# Patient Record
Sex: MALE | Race: WHITE | NOT HISPANIC OR LATINO | Employment: FULL TIME | URBAN - METROPOLITAN AREA
[De-identification: names, ages, dates, MRNs, and addresses within clinical notes are randomized per-mention and may not be internally consistent; named-entity substitution may affect disease eponyms.]

---

## 2017-12-12 ENCOUNTER — HISTORICAL (OUTPATIENT)
Dept: ADMINISTRATIVE | Facility: HOSPITAL | Age: 52
End: 2017-12-12

## 2017-12-12 LAB
ALBUMIN SERPL-MCNC: 3.9 GM/DL (ref 3.4–5)
ALBUMIN/GLOB SERPL: 1.1 {RATIO}
ALP SERPL-CCNC: 118 UNIT/L (ref 50–136)
ALT SERPL-CCNC: 13 UNIT/L (ref 12–78)
AST SERPL-CCNC: 16 UNIT/L (ref 15–37)
BILIRUB SERPL-MCNC: 0.4 MG/DL (ref 0.2–1)
BILIRUBIN DIRECT+TOT PNL SERPL-MCNC: 0.1 MG/DL (ref 0–0.2)
BILIRUBIN DIRECT+TOT PNL SERPL-MCNC: 0.3 MG/DL (ref 0–0.8)
BUN SERPL-MCNC: 8 MG/DL (ref 7–18)
CALCIUM SERPL-MCNC: 9.1 MG/DL (ref 8.5–10.1)
CHLORIDE SERPL-SCNC: 104 MMOL/L (ref 98–107)
CHOLEST SERPL-MCNC: 169 MG/DL (ref 0–200)
CHOLEST/HDLC SERPL: 3.8 {RATIO} (ref 0–5)
CO2 SERPL-SCNC: 28 MMOL/L (ref 21–32)
CREAT SERPL-MCNC: 0.91 MG/DL (ref 0.7–1.3)
EST. AVERAGE GLUCOSE BLD GHB EST-MCNC: 108 MG/DL
GLOBULIN SER-MCNC: 3.5 GM/DL (ref 2.4–3.5)
GLUCOSE SERPL-MCNC: 94 MG/DL (ref 74–106)
HBA1C MFR BLD: 5.4 % (ref 4.2–6.3)
HDLC SERPL-MCNC: 44 MG/DL (ref 35–60)
LDLC SERPL CALC-MCNC: 104 MG/DL (ref 0–129)
POTASSIUM SERPL-SCNC: 4.8 MMOL/L (ref 3.5–5.1)
PROT SERPL-MCNC: 7.4 GM/DL (ref 6.4–8.2)
SODIUM SERPL-SCNC: 139 MMOL/L (ref 136–145)
TRIGL SERPL-MCNC: 106 MG/DL (ref 30–150)
VLDLC SERPL CALC-MCNC: 21 MG/DL

## 2019-04-22 ENCOUNTER — HISTORICAL (OUTPATIENT)
Dept: ADMINISTRATIVE | Facility: HOSPITAL | Age: 54
End: 2019-04-22

## 2019-04-22 LAB
APPEARANCE, UA: CLEAR
BACTERIA SPEC CULT: NORMAL /HPF
BILIRUB UR QL STRIP: NEGATIVE
BUN SERPL-MCNC: 14 MG/DL (ref 7–18)
CALCIUM SERPL-MCNC: 9.2 MG/DL (ref 8.5–10.1)
CHLORIDE SERPL-SCNC: 107 MMOL/L (ref 98–107)
CHOLEST SERPL-MCNC: 180 MG/DL (ref 0–200)
CHOLEST/HDLC SERPL: 3.4 {RATIO} (ref 0–5)
CO2 SERPL-SCNC: 27 MMOL/L (ref 21–32)
COLOR UR: YELLOW
CREAT SERPL-MCNC: 0.85 MG/DL (ref 0.7–1.3)
CREAT/UREA NIT SERPL: 16.5
GLUCOSE (UA): NEGATIVE
GLUCOSE SERPL-MCNC: 102 MG/DL (ref 74–106)
HDLC SERPL-MCNC: 53 MG/DL (ref 35–60)
HGB UR QL STRIP: NEGATIVE
KETONES UR QL STRIP: NEGATIVE
LDLC SERPL CALC-MCNC: 111 MG/DL (ref 0–129)
LEUKOCYTE ESTERASE UR QL STRIP: NEGATIVE
NITRITE UR QL STRIP: NEGATIVE
PH UR STRIP: 6 [PH] (ref 5–9)
POTASSIUM SERPL-SCNC: 5.1 MMOL/L (ref 3.5–5.1)
PROT UR QL STRIP: NEGATIVE
RBC #/AREA URNS HPF: NORMAL /[HPF]
SODIUM SERPL-SCNC: 138 MMOL/L (ref 136–145)
SP GR UR STRIP: 1.01 (ref 1–1.03)
SQUAMOUS EPITHELIAL, UA: NORMAL
TRIGL SERPL-MCNC: 79 MG/DL (ref 30–150)
UROBILINOGEN UR STRIP-ACNC: 0.2
VLDLC SERPL CALC-MCNC: 16 MG/DL
WBC #/AREA URNS HPF: NORMAL /HPF

## 2019-06-10 ENCOUNTER — HISTORICAL (OUTPATIENT)
Dept: RADIOLOGY | Facility: HOSPITAL | Age: 54
End: 2019-06-10

## 2019-10-23 ENCOUNTER — HISTORICAL (OUTPATIENT)
Dept: ADMINISTRATIVE | Facility: HOSPITAL | Age: 54
End: 2019-10-23

## 2019-10-23 LAB
ALBUMIN SERPL-MCNC: 3.7 GM/DL (ref 3.4–5)
ALBUMIN/GLOB SERPL: 1.2 {RATIO}
ALP SERPL-CCNC: 137 UNIT/L (ref 50–136)
ALT SERPL-CCNC: 10 UNIT/L (ref 12–78)
AST SERPL-CCNC: 14 UNIT/L (ref 15–37)
BILIRUB SERPL-MCNC: 0.4 MG/DL (ref 0.2–1)
BILIRUBIN DIRECT+TOT PNL SERPL-MCNC: 0.1 MG/DL (ref 0–0.2)
BILIRUBIN DIRECT+TOT PNL SERPL-MCNC: 0.3 MG/DL (ref 0–0.8)
BUN SERPL-MCNC: 8 MG/DL (ref 7–18)
CALCIUM SERPL-MCNC: 8.6 MG/DL (ref 8.5–10.1)
CHLORIDE SERPL-SCNC: 106 MMOL/L (ref 98–107)
CHOLEST SERPL-MCNC: 157 MG/DL (ref 0–200)
CHOLEST/HDLC SERPL: 4 {RATIO} (ref 0–5)
CO2 SERPL-SCNC: 28 MMOL/L (ref 21–32)
CREAT SERPL-MCNC: 0.93 MG/DL (ref 0.7–1.3)
GLOBULIN SER-MCNC: 3.1 GM/DL (ref 2.4–3.5)
GLUCOSE SERPL-MCNC: 89 MG/DL (ref 74–106)
HDLC SERPL-MCNC: 39 MG/DL (ref 35–60)
LDLC SERPL CALC-MCNC: 98 MG/DL (ref 0–129)
POTASSIUM SERPL-SCNC: 4.2 MMOL/L (ref 3.5–5.1)
PROT SERPL-MCNC: 6.8 GM/DL (ref 6.4–8.2)
SODIUM SERPL-SCNC: 139 MMOL/L (ref 136–145)
TRIGL SERPL-MCNC: 99 MG/DL (ref 30–150)
VLDLC SERPL CALC-MCNC: 20 MG/DL

## 2020-01-08 ENCOUNTER — HISTORICAL (OUTPATIENT)
Dept: ADMINISTRATIVE | Facility: HOSPITAL | Age: 55
End: 2020-01-08

## 2020-01-08 LAB
CHOLEST SERPL-MCNC: 228 MG/DL (ref 0–200)
CHOLEST/HDLC SERPL: 8.8 {RATIO} (ref 0–5)
HDLC SERPL-MCNC: 26 MG/DL (ref 35–60)
LDLC SERPL CALC-MCNC: 92 MG/DL (ref 0–129)
TRIGL SERPL-MCNC: 551 MG/DL (ref 30–150)
VLDLC SERPL CALC-MCNC: 110 MG/DL

## 2020-10-02 ENCOUNTER — HISTORICAL (OUTPATIENT)
Dept: ADMINISTRATIVE | Facility: HOSPITAL | Age: 55
End: 2020-10-02

## 2020-10-02 LAB
ABS NEUT (OLG): 2.85 X10(3)/MCL (ref 2.1–9.2)
ALBUMIN SERPL-MCNC: 4.1 GM/DL (ref 3.5–5)
ALBUMIN/GLOB SERPL: 1.4 RATIO (ref 1.1–2)
ALP SERPL-CCNC: 116 UNIT/L (ref 40–150)
ALT SERPL-CCNC: 8 UNIT/L (ref 0–55)
AST SERPL-CCNC: 16 UNIT/L (ref 5–34)
BASOPHILS # BLD AUTO: 0 X10(3)/MCL (ref 0–0.2)
BASOPHILS NFR BLD AUTO: 0 %
BILIRUB SERPL-MCNC: 0.6 MG/DL
BILIRUBIN DIRECT+TOT PNL SERPL-MCNC: 0.2 MG/DL (ref 0–0.5)
BILIRUBIN DIRECT+TOT PNL SERPL-MCNC: 0.4 MG/DL (ref 0–0.8)
BUN SERPL-MCNC: 7.6 MG/DL (ref 8.4–25.7)
CALCIUM SERPL-MCNC: 9 MG/DL (ref 8.4–10.2)
CHLORIDE SERPL-SCNC: 104 MMOL/L (ref 98–107)
CHOLEST SERPL-MCNC: 172 MG/DL
CHOLEST/HDLC SERPL: 5 {RATIO} (ref 0–5)
CO2 SERPL-SCNC: 24 MMOL/L (ref 22–29)
CREAT SERPL-MCNC: 0.77 MG/DL (ref 0.73–1.18)
EOSINOPHIL # BLD AUTO: 0.2 X10(3)/MCL (ref 0–0.9)
EOSINOPHIL NFR BLD AUTO: 3 %
ERYTHROCYTE [DISTWIDTH] IN BLOOD BY AUTOMATED COUNT: 12.9 % (ref 11.5–17)
GLOBULIN SER-MCNC: 3 GM/DL (ref 2.4–3.5)
GLUCOSE SERPL-MCNC: 93 MG/DL (ref 74–100)
HCT VFR BLD AUTO: 44.4 % (ref 42–52)
HDLC SERPL-MCNC: 36 MG/DL (ref 35–60)
HGB BLD-MCNC: 14.8 GM/DL (ref 14–18)
LDLC SERPL CALC-MCNC: 96 MG/DL (ref 50–140)
LYMPHOCYTES # BLD AUTO: 2.3 X10(3)/MCL (ref 0.6–4.6)
LYMPHOCYTES NFR BLD AUTO: 40 %
MCH RBC QN AUTO: 33 PG (ref 27–31)
MCHC RBC AUTO-ENTMCNC: 33.3 GM/DL (ref 33–36)
MCV RBC AUTO: 98.9 FL (ref 80–94)
MONOCYTES # BLD AUTO: 0.5 X10(3)/MCL (ref 0.1–1.3)
MONOCYTES NFR BLD AUTO: 8 %
NEUTROPHILS # BLD AUTO: 2.85 X10(3)/MCL (ref 2.1–9.2)
NEUTROPHILS NFR BLD AUTO: 49 %
PLATELET # BLD AUTO: 275 X10(3)/MCL (ref 130–400)
PMV BLD AUTO: 10.4 FL (ref 9.4–12.4)
POTASSIUM SERPL-SCNC: 4.9 MMOL/L (ref 3.5–5.1)
PROT SERPL-MCNC: 7.1 GM/DL (ref 6.4–8.3)
PSA SERPL-MCNC: 0.45 NG/ML
RBC # BLD AUTO: 4.49 X10(6)/MCL (ref 4.7–6.1)
SODIUM SERPL-SCNC: 140 MMOL/L (ref 136–145)
TRIGL SERPL-MCNC: 201 MG/DL (ref 34–140)
TSH SERPL-ACNC: 1.1 UIU/ML (ref 0.35–4.94)
VLDLC SERPL CALC-MCNC: 40 MG/DL
WBC # SPEC AUTO: 5.8 X10(3)/MCL (ref 4.5–11.5)

## 2021-01-11 ENCOUNTER — HISTORICAL (OUTPATIENT)
Dept: RADIOLOGY | Facility: HOSPITAL | Age: 56
End: 2021-01-11

## 2021-05-17 LAB — CRC RECOMMENDATION EXT: NORMAL

## 2021-10-15 ENCOUNTER — HISTORICAL (OUTPATIENT)
Dept: ADMINISTRATIVE | Facility: HOSPITAL | Age: 56
End: 2021-10-15

## 2021-10-15 LAB
ABS NEUT (OLG): 3.32 X10(3)/MCL (ref 2.1–9.2)
ALBUMIN SERPL-MCNC: 3.8 GM/DL (ref 3.5–5)
ALBUMIN/GLOB SERPL: 1.3 RATIO (ref 1.1–2)
ALP SERPL-CCNC: 131 UNIT/L (ref 40–150)
ALT SERPL-CCNC: 7 UNIT/L (ref 0–55)
APPEARANCE, UA: CLEAR
AST SERPL-CCNC: 21 UNIT/L (ref 5–34)
BACTERIA SPEC CULT: NORMAL /HPF
BASOPHILS # BLD AUTO: 0 X10(3)/MCL (ref 0–0.2)
BASOPHILS NFR BLD AUTO: 0 %
BILIRUB SERPL-MCNC: 0.5 MG/DL
BILIRUB UR QL STRIP: NEGATIVE
BILIRUBIN DIRECT+TOT PNL SERPL-MCNC: 0.2 MG/DL (ref 0–0.5)
BILIRUBIN DIRECT+TOT PNL SERPL-MCNC: 0.3 MG/DL (ref 0–0.8)
BUN SERPL-MCNC: 15 MG/DL (ref 8.4–25.7)
CALCIUM SERPL-MCNC: 9.2 MG/DL (ref 8.4–10.2)
CHLORIDE SERPL-SCNC: 105 MMOL/L (ref 98–107)
CHOLEST SERPL-MCNC: 162 MG/DL
CHOLEST/HDLC SERPL: 5 {RATIO} (ref 0–5)
CO2 SERPL-SCNC: 26 MMOL/L (ref 22–29)
COLOR UR: YELLOW
CREAT SERPL-MCNC: 0.87 MG/DL (ref 0.73–1.18)
EOSINOPHIL # BLD AUTO: 0.2 X10(3)/MCL (ref 0–0.9)
EOSINOPHIL NFR BLD AUTO: 4 %
ERYTHROCYTE [DISTWIDTH] IN BLOOD BY AUTOMATED COUNT: 13.2 % (ref 11.5–17)
EST. AVERAGE GLUCOSE BLD GHB EST-MCNC: 99.7 MG/DL
GLOBULIN SER-MCNC: 3 GM/DL (ref 2.4–3.5)
GLUCOSE (UA): NEGATIVE
GLUCOSE SERPL-MCNC: 89 MG/DL (ref 74–100)
HBA1C MFR BLD: 5.1 %
HCT VFR BLD AUTO: 43 % (ref 42–52)
HDLC SERPL-MCNC: 31 MG/DL (ref 35–60)
HGB BLD-MCNC: 14.3 GM/DL (ref 14–18)
HGB UR QL STRIP: NEGATIVE
KETONES UR QL STRIP: NEGATIVE
LDLC SERPL CALC-MCNC: ABNORMAL MG/DL (ref 50–140)
LEUKOCYTE ESTERASE UR QL STRIP: NEGATIVE
LYMPHOCYTES # BLD AUTO: 2.2 X10(3)/MCL (ref 0.6–4.6)
LYMPHOCYTES NFR BLD AUTO: 34 %
MCH RBC QN AUTO: 32.4 PG (ref 27–31)
MCHC RBC AUTO-ENTMCNC: 33.3 GM/DL (ref 33–36)
MCV RBC AUTO: 97.3 FL (ref 80–94)
MONOCYTES # BLD AUTO: 0.6 X10(3)/MCL (ref 0.1–1.3)
MONOCYTES NFR BLD AUTO: 9 %
NEUTROPHILS # BLD AUTO: 3.32 X10(3)/MCL (ref 2.1–9.2)
NEUTROPHILS NFR BLD AUTO: 52 %
NITRITE UR QL STRIP: NEGATIVE
PH UR STRIP: 6 [PH] (ref 5–9)
PLATELET # BLD AUTO: 260 X10(3)/MCL (ref 130–400)
PMV BLD AUTO: 11.6 FL (ref 9.4–12.4)
POTASSIUM SERPL-SCNC: 4.5 MMOL/L (ref 3.5–5.1)
PROT SERPL-MCNC: 6.8 GM/DL (ref 6.4–8.3)
PROT UR QL STRIP: NEGATIVE
PSA SERPL-MCNC: 0.46 NG/ML
RBC # BLD AUTO: 4.42 X10(6)/MCL (ref 4.7–6.1)
RBC #/AREA URNS HPF: NORMAL /[HPF]
SODIUM SERPL-SCNC: 142 MMOL/L (ref 136–145)
SP GR UR STRIP: 1.01 (ref 1–1.03)
SQUAMOUS EPITHELIAL, UA: NORMAL /HPF (ref 0–4)
TESTOST SERPL-MCNC: 363.38 NG/DL (ref 220.91–715.81)
TRIGL SERPL-MCNC: 405 MG/DL (ref 34–140)
TSH SERPL-ACNC: 2.35 UIU/ML (ref 0.35–4.94)
UROBILINOGEN UR STRIP-ACNC: 0.2
VLDLC SERPL CALC-MCNC: ABNORMAL MG/DL
WBC # SPEC AUTO: 6.3 X10(3)/MCL (ref 4.5–11.5)
WBC #/AREA URNS HPF: NORMAL /HPF

## 2021-12-01 ENCOUNTER — HISTORICAL (OUTPATIENT)
Dept: ADMINISTRATIVE | Facility: HOSPITAL | Age: 56
End: 2021-12-01

## 2021-12-01 LAB
CHOLEST SERPL-MCNC: 193 MG/DL
CHOLEST/HDLC SERPL: 6 {RATIO} (ref 0–5)
HDLC SERPL-MCNC: 33 MG/DL (ref 35–60)
LDLC SERPL CALC-MCNC: 131 MG/DL (ref 50–140)
TRIGL SERPL-MCNC: 146 MG/DL (ref 34–140)
VLDLC SERPL CALC-MCNC: 29 MG/DL

## 2022-02-07 ENCOUNTER — HISTORICAL (OUTPATIENT)
Dept: RADIOLOGY | Facility: HOSPITAL | Age: 57
End: 2022-02-07

## 2022-02-07 ENCOUNTER — HISTORICAL (OUTPATIENT)
Dept: ADMINISTRATIVE | Facility: HOSPITAL | Age: 57
End: 2022-02-07

## 2022-02-10 ENCOUNTER — HISTORICAL (OUTPATIENT)
Dept: ADMINISTRATIVE | Facility: HOSPITAL | Age: 57
End: 2022-02-10

## 2022-02-10 LAB
CHOLEST SERPL-MCNC: 133 MG/DL
CHOLEST/HDLC SERPL: 4 {RATIO} (ref 0–5)
HDLC SERPL-MCNC: 30 MG/DL (ref 35–60)
LDLC SERPL CALC-MCNC: 88 MG/DL (ref 50–140)
TRIGL SERPL-MCNC: 76 MG/DL (ref 34–140)
VLDLC SERPL CALC-MCNC: 15 MG/DL

## 2022-04-10 ENCOUNTER — HISTORICAL (OUTPATIENT)
Dept: ADMINISTRATIVE | Facility: HOSPITAL | Age: 57
End: 2022-04-10
Payer: COMMERCIAL

## 2022-04-26 VITALS
HEIGHT: 65 IN | BODY MASS INDEX: 28.6 KG/M2 | OXYGEN SATURATION: 100 % | WEIGHT: 171.63 LBS | DIASTOLIC BLOOD PRESSURE: 74 MMHG | SYSTOLIC BLOOD PRESSURE: 116 MMHG

## 2022-08-15 DIAGNOSIS — I10 HYPERTENSION, UNSPECIFIED TYPE: Primary | ICD-10-CM

## 2022-08-15 DIAGNOSIS — E78.1 HYPERTRIGLYCERIDEMIA: ICD-10-CM

## 2022-08-19 ENCOUNTER — LAB VISIT (OUTPATIENT)
Dept: LAB | Facility: HOSPITAL | Age: 57
End: 2022-08-19
Attending: INTERNAL MEDICINE
Payer: COMMERCIAL

## 2022-08-19 DIAGNOSIS — I10 HYPERTENSION, UNSPECIFIED TYPE: ICD-10-CM

## 2022-08-19 DIAGNOSIS — E78.1 HYPERTRIGLYCERIDEMIA: ICD-10-CM

## 2022-08-19 LAB
ALBUMIN SERPL-MCNC: 3.7 GM/DL (ref 3.5–5)
ALP SERPL-CCNC: 128 UNIT/L (ref 40–150)
ALT SERPL-CCNC: 13 UNIT/L (ref 0–55)
ANION GAP SERPL CALC-SCNC: 9 MEQ/L
AST SERPL-CCNC: 20 UNIT/L (ref 5–34)
BILIRUBIN DIRECT+TOT PNL SERPL-MCNC: 0.1 MG/DL (ref 0–0.5)
BILIRUBIN DIRECT+TOT PNL SERPL-MCNC: 0.1 MG/DL (ref 0–0.8)
BILIRUBIN DIRECT+TOT PNL SERPL-MCNC: 0.2 MG/DL
BUN SERPL-MCNC: 18.9 MG/DL (ref 8.4–25.7)
CALCIUM SERPL-MCNC: 9.6 MG/DL (ref 8.4–10.2)
CHLORIDE SERPL-SCNC: 107 MMOL/L (ref 98–107)
CHOLEST SERPL-MCNC: 186 MG/DL
CHOLEST/HDLC SERPL: 6 {RATIO} (ref 0–5)
CO2 SERPL-SCNC: 25 MMOL/L (ref 22–29)
CREAT SERPL-MCNC: 0.84 MG/DL (ref 0.73–1.18)
CREAT/UREA NIT SERPL: 23
GFR SERPLBLD CREATININE-BSD FMLA CKD-EPI: >60 MLS/MIN/1.73/M2
GLUCOSE SERPL-MCNC: 96 MG/DL (ref 74–100)
HDLC SERPL-MCNC: 31 MG/DL (ref 35–60)
LDLC SERPL CALC-MCNC: 108 MG/DL (ref 50–140)
PATH REV: NORMAL
POTASSIUM SERPL-SCNC: 4.4 MMOL/L (ref 3.5–5.1)
PROT SERPL-MCNC: 6.8 GM/DL (ref 6.4–8.3)
SODIUM SERPL-SCNC: 141 MMOL/L (ref 136–145)
TRIGL SERPL-MCNC: 237 MG/DL (ref 34–140)
VLDLC SERPL CALC-MCNC: 47 MG/DL

## 2022-08-19 PROCEDURE — 36415 COLL VENOUS BLD VENIPUNCTURE: CPT

## 2022-08-19 PROCEDURE — 80061 LIPID PANEL: CPT

## 2022-08-19 PROCEDURE — 82248 BILIRUBIN DIRECT: CPT

## 2022-08-19 PROCEDURE — 80053 COMPREHEN METABOLIC PANEL: CPT

## 2022-08-22 ENCOUNTER — OFFICE VISIT (OUTPATIENT)
Dept: INTERNAL MEDICINE | Facility: CLINIC | Age: 57
End: 2022-08-22
Payer: COMMERCIAL

## 2022-08-22 VITALS
BODY MASS INDEX: 23.95 KG/M2 | SYSTOLIC BLOOD PRESSURE: 116 MMHG | OXYGEN SATURATION: 98 % | TEMPERATURE: 98 F | DIASTOLIC BLOOD PRESSURE: 62 MMHG | HEART RATE: 85 BPM | WEIGHT: 161.69 LBS | HEIGHT: 69 IN | RESPIRATION RATE: 16 BRPM

## 2022-08-22 DIAGNOSIS — Z72.0 TOBACCO USER: Primary | ICD-10-CM

## 2022-08-22 DIAGNOSIS — E78.00 ELEVATED LDL CHOLESTEROL LEVEL: ICD-10-CM

## 2022-08-22 DIAGNOSIS — G47.10 HYPERSOMNOLENCE: ICD-10-CM

## 2022-08-22 DIAGNOSIS — I10 HYPERTENSION, UNSPECIFIED TYPE: ICD-10-CM

## 2022-08-22 PROCEDURE — 1159F MED LIST DOCD IN RCRD: CPT | Mod: CPTII,,, | Performed by: INTERNAL MEDICINE

## 2022-08-22 PROCEDURE — 1160F PR REVIEW ALL MEDS BY PRESCRIBER/CLIN PHARMACIST DOCUMENTED: ICD-10-PCS | Mod: CPTII,,, | Performed by: INTERNAL MEDICINE

## 2022-08-22 PROCEDURE — 1160F RVW MEDS BY RX/DR IN RCRD: CPT | Mod: CPTII,,, | Performed by: INTERNAL MEDICINE

## 2022-08-22 PROCEDURE — 4010F ACE/ARB THERAPY RXD/TAKEN: CPT | Mod: CPTII,,, | Performed by: INTERNAL MEDICINE

## 2022-08-22 PROCEDURE — 3008F BODY MASS INDEX DOCD: CPT | Mod: CPTII,,, | Performed by: INTERNAL MEDICINE

## 2022-08-22 PROCEDURE — 4010F PR ACE/ARB THEARPY RXD/TAKEN: ICD-10-PCS | Mod: CPTII,,, | Performed by: INTERNAL MEDICINE

## 2022-08-22 PROCEDURE — 3074F SYST BP LT 130 MM HG: CPT | Mod: CPTII,,, | Performed by: INTERNAL MEDICINE

## 2022-08-22 PROCEDURE — 3078F DIAST BP <80 MM HG: CPT | Mod: CPTII,,, | Performed by: INTERNAL MEDICINE

## 2022-08-22 PROCEDURE — 99214 PR OFFICE/OUTPT VISIT, EST, LEVL IV, 30-39 MIN: ICD-10-PCS | Mod: ,,, | Performed by: INTERNAL MEDICINE

## 2022-08-22 PROCEDURE — 99214 OFFICE O/P EST MOD 30 MIN: CPT | Mod: ,,, | Performed by: INTERNAL MEDICINE

## 2022-08-22 PROCEDURE — 3078F PR MOST RECENT DIASTOLIC BLOOD PRESSURE < 80 MM HG: ICD-10-PCS | Mod: CPTII,,, | Performed by: INTERNAL MEDICINE

## 2022-08-22 PROCEDURE — 3074F PR MOST RECENT SYSTOLIC BLOOD PRESSURE < 130 MM HG: ICD-10-PCS | Mod: CPTII,,, | Performed by: INTERNAL MEDICINE

## 2022-08-22 PROCEDURE — 1159F PR MEDICATION LIST DOCUMENTED IN MEDICAL RECORD: ICD-10-PCS | Mod: CPTII,,, | Performed by: INTERNAL MEDICINE

## 2022-08-22 PROCEDURE — 3008F PR BODY MASS INDEX (BMI) DOCUMENTED: ICD-10-PCS | Mod: CPTII,,, | Performed by: INTERNAL MEDICINE

## 2022-08-22 RX ORDER — AMLODIPINE AND OLMESARTAN MEDOXOMIL 5; 40 MG/1; MG/1
1 TABLET ORAL DAILY
COMMUNITY
Start: 2022-01-19 | End: 2023-02-28

## 2022-08-22 RX ORDER — CITALOPRAM 40 MG/1
40 TABLET, FILM COATED ORAL DAILY
COMMUNITY
Start: 2022-08-13 | End: 2023-05-08 | Stop reason: SDUPTHER

## 2022-08-22 RX ORDER — SILDENAFIL 50 MG/1
50 TABLET, FILM COATED ORAL
COMMUNITY
Start: 2021-10-15

## 2022-08-22 NOTE — PROGRESS NOTES
Subjective:      Patient ID: Satya Hilliard is a 56 y.o. male.    Chief Complaint: Hypertension (6 month f/u)      HPI:    56 year old  male here for  revisit   History of hypertension   Dr. Lama for his prostate; sees him for PSA checks and physicals   Shu Johnson for colonoscopy   He is a smoker and has been a smoker for the age of 18 to present and smokes currently 1-1/2 packs per day   low-dose screening CT scan of the chest 2/2021   He is a    [place] 10 year cardiovascular risk score of 24% which is high risk--recent him to cardiology for cardiovascular workup which was negative. 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  Advised to decrease intake of simple sugars.  Patient voices understanding           Hypertension    Current Assessment & Plan     In accordance with JNC 8 guidelines patient at goal, continue current regimen              Other    Tobacco user - Primary    Current Assessment & Plan     Advised on cessation, low-dose screening CT of the chest due in February, orders placed           Relevant Orders    CT Chest Lung Screening Low Dose    Hypersomnolence    Current Assessment & Plan     Patient reports hypersomnolence, feelings of unrest upon awakening.  Frequent daytime naps.  Referral for home sleep study.           Relevant Orders    Ambulatory referral/consult to Sleep Disorders              Past Medical History:  Past Medical History:   Diagnosis Date    Depression      Hypertriglyceridemia     Tobacco user      Past Surgical History:   Procedure Laterality Date    HERNIA REPAIR  1989     Review of patient's allergies indicates:  No Known Allergies  Current Outpatient Medications on File Prior to Visit   Medication Sig Dispense Refill    amlodipine-olmesartan (KEITH) 5-40 mg per tablet Take 1 tablet by mouth once daily.      citalopram (CELEXA) 40 MG tablet Take 40 mg by mouth once daily.      sildenafiL (VIAGRA) 50 MG tablet Take 50 mg by mouth as needed.       No current facility-administered medications on file prior to visit.     Social History     Socioeconomic History    Marital status:    Tobacco Use    Smoking status: Current Every Day Smoker     Packs/day: 1.00     Types: Cigarettes    Smokeless tobacco: Never Used   Substance and Sexual Activity    Alcohol use: Not Currently     Alcohol/week: 42.0 standard drinks     Types: 42 Cans of beer per week    Sexual activity: Yes     Partners: Female     Family History   Problem Relation Age of Onset    Hypertension Mother     Bone cancer Father            Review of Systems  Constitutional: No fever,  no fatigue, no chills, no night sweats, no weight gain, no weight loss, no changes in appetite.   Eye: No redness, no acute vision loss, no blurred vision, no double vision, no eye pain  ENMT: No sore throat, no nasal drainage, no nose bleeds,  no headache, no ear pain, no ear drainage, no acute hearing loss  Respiratory: No cough, no sputum production, no shortness of breath, no hemoptysis, no wheezing.  Cardiovascular: No chest pain, no chest tightness, no HUTCHINS, no PND, no orthopnea, no swelling, no palpitations.  Gastrointestinal: No abdominal pain, no nausea, no vomiting, no diarrhea, no constipation, no difficulty swallowing, no change in bowel habits, no rectal bleeding  Genitourinary: no urgency, no frequency, no burning or pain when urinating, no blood in urine, no incontinence  Heme/Lymph: No easy bruising  "and/or bleeding, no swollen or painful glands.  Endocrine: No polyuria, no polydipsia, no polyphagia, no heat or cold intolerance.  Musculoskeletal: No muscle pain, no muscle weakness, no joint pain, no red or swollen joints.  Integumentary: No rash, no pruritis, no hair or nail changes.  Neurologic: No dizziness, no fainting, no tremors, no tingling and/ or numbness.  Psychiatric: No anxiety, no depression, no memory loss  All Other ROS: Negative with exception of what is documented in the history of present illness     Objective:   /62 (BP Location: Left arm, Patient Position: Sitting, BP Method: Medium (Manual))   Pulse 85   Temp 97.5 °F (36.4 °C) (Temporal)   Resp 16   Ht 5' 9" (1.753 m)   Wt 73.3 kg (161 lb 11.2 oz)   SpO2 98%   BMI 23.88 kg/m²     Physical Exam  General : Alert and oriented, No acute distress, well, developed, well nourished, afebrile   Eye : PERRLA. EOMI. Normal conjunctiva without injection. Sclerae are nonicteric. No pallor.  HEENT : Normocephalic. Neck supple. Normal hearing. Oral mucosa is moist.  Respiratory : Lungs are clear to auscultation bilaterally, non-labored. Symmetrical chest wall expansion. No crackles, wheeze, or rhonci.  Cardiovascular : Normal rate, Regular rhythm. No murmurs, rubs, or gallops. Pulses 2+ in all extremities. No Edema.  Gastrointestinal : Soft, nontender, non-distended, bowel sounds normal, no organomegaly, no guarding, no rebound.  Musculoskeletal : Normal ROM.  No muscle tenderness.  Integumentary : Warm to touch. Intact. No rash.    Neurologic : Alert and oriented. No focal deficits.   Psychiatric : Cooperative, Appropriate mood & affect. Normal judgment.          Assessment:     1. Tobacco user    2. Hypertension, unspecified type    3. Elevated LDL cholesterol level    4. Hypersomnolence                  Plan:       I am having Satya Hilliard maintain his amlodipine-olmesartan, citalopram, and sildenafiL. We will continue to administer " varicella-zoster gE-AS01B (PF).      Problem List Items Addressed This Visit        Cardiac/Vascular    Elevated LDL cholesterol level     Triglycerides elevated over 200 patient states he has been eating a lot of candy grabbing sugar [place] is [place] the truck stop.  Advised to decrease intake of simple sugars.  Patient voices understanding           Hypertension     In accordance with JNC 8 guidelines patient at goal, continue current regimen              Other    Tobacco user - Primary     Advised on cessation, low-dose screening CT of the chest due in February, orders placed           Relevant Orders    CT Chest Lung Screening Low Dose    Hypersomnolence     Patient reports hypersomnolence, feelings of unrest upon awakening.  Frequent daytime naps.  Referral for home sleep study.           Relevant Orders    Ambulatory referral/consult to Sleep Disorders            Satya was seen today for hypertension.    Diagnoses and all orders for this visit:    Tobacco user  -     CT Chest Lung Screening Low Dose; Future    Hypertension, unspecified type    Elevated LDL cholesterol level    Hypersomnolence  -     Ambulatory referral/consult to Sleep Disorders; Future    Other orders  -     varicella-zoster gE-AS01B (PF)(SHINGRIX) 50 mcg/0.5 mL injection            Medications Ordered This Encounter   Medications    varicella-zoster gE-AS01B (PF)(SHINGRIX) 50 mcg/0.5 mL injection     [unfilled]  Orders Placed This Encounter   Procedures    CT Chest Lung Screening Low Dose     Standing Status:   Future     Standing Expiration Date:   8/22/2023     Scheduling Instructions:      2-2023     Order Specific Question:   Is there documentation of shared decision making for this lung screening exam?     Answer:   Yes     Order Specific Question:   Is the patient a current smoker?     Answer:   Yes     Order Specific Question:   Is the patient a current or former smoker who quit within the past 15 years?     Answer:   Yes     Order  Specific Question:   Is the patient between the age 50-80 years old?     Answer:   Yes     Order Specific Question:   Has the patient smoked 20 or more packs of cigarettes/year?     Answer:   Yes     Order Specific Question:   Does the patient show any signs or symptoms of lung cancer?     Answer:   No     Order Specific Question:   Is this the first (baseline) CT or an annual exam?     Answer:   Annual [2]     Order Specific Question:   May the Radiologist modify the order per protocol to meet the clinical needs of the patient?     Answer:   Yes     Order Specific Question:   Is this a low dose screening chest CT?     Answer:   Yes     Order Specific Question:   Is this a low dose screening chest CT?     Answer:   Yes    Ambulatory referral/consult to Sleep Disorders     Standing Status:   Future     Standing Expiration Date:   9/22/2023     Referral Priority:   Routine     Referral Type:   Consultation     Referred to Provider:   [facility]     Requested Specialty:   Sleep Medicine     Number of Visits Requested:   1       Medication List with Changes/Refills   Current Medications    AMLODIPINE-OLMESARTAN (KEITH) 5-40 MG PER TABLET    Take 1 tablet by mouth once daily.    CITALOPRAM (CELEXA) 40 MG TABLET    Take 40 mg by mouth once daily.    SILDENAFIL (VIAGRA) 50 MG TABLET    Take 50 mg by mouth as needed.      Medication List with Changes/Refills   Current Medications    AMLODIPINE-OLMESARTAN (KEITH) 5-40 MG PER TABLET    Take 1 tablet by mouth once daily.       Start Date: 1/19/2022 End Date: --    CITALOPRAM (CELEXA) 40 MG TABLET    Take 40 mg by mouth once daily.       Start Date: 8/13/2022 End Date: --    SILDENAFIL (VIAGRA) 50 MG TABLET    Take 50 mg by mouth as needed.       Start Date: 10/15/2021End Date: --            Follow up in about 6 months (around 2/22/2023) for WELLNESS, with labs prior to visit.

## 2022-08-22 NOTE — ASSESSMENT & PLAN NOTE
Patient reports hypersomnolence, feelings of unrest upon awakening.  Frequent daytime naps.  Referral for home sleep study.

## 2022-08-22 NOTE — ASSESSMENT & PLAN NOTE
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  Advised to decrease intake of simple sugars.  Patient voices understanding

## 2022-08-26 ENCOUNTER — TELEPHONE (OUTPATIENT)
Dept: INTERNAL MEDICINE | Facility: CLINIC | Age: 57
End: 2022-08-26
Payer: COMMERCIAL

## 2022-08-26 NOTE — TELEPHONE ENCOUNTER
----- Message from Beverley Gatica sent at 8/26/2022  8:41 AM CDT -----  Regarding: Referral  .Type:  Needs Medical Advice    Who Called: Brayden Hope  Symptoms (please be specific):    How long has patient had these symptoms:    Pharmacy name and phone #:    Would the patient rather a call back or a response via [facility]? Call back  Best Call Back Number: [phone]  Additional Information: Missing provider signature on notes from 8/22 can fax over to [phone]

## 2023-02-02 ENCOUNTER — DOCUMENTATION ONLY (OUTPATIENT)
Dept: ADMINISTRATIVE | Facility: HOSPITAL | Age: 58
End: 2023-02-02
Payer: COMMERCIAL

## 2023-03-06 ENCOUNTER — TELEPHONE (OUTPATIENT)
Dept: INTERNAL MEDICINE | Facility: CLINIC | Age: 58
End: 2023-03-06
Payer: COMMERCIAL

## 2023-03-06 DIAGNOSIS — Z12.5 SCREENING PSA (PROSTATE SPECIFIC ANTIGEN): ICD-10-CM

## 2023-03-06 DIAGNOSIS — E55.9 VITAMIN D DEFICIENCY: ICD-10-CM

## 2023-03-06 DIAGNOSIS — D50.9 IRON DEFICIENCY ANEMIA, UNSPECIFIED IRON DEFICIENCY ANEMIA TYPE: ICD-10-CM

## 2023-03-06 DIAGNOSIS — Z13.29 SCREENING FOR HYPOTHYROIDISM: ICD-10-CM

## 2023-03-06 DIAGNOSIS — Z00.00 WELLNESS EXAMINATION: Primary | ICD-10-CM

## 2023-03-06 NOTE — TELEPHONE ENCOUNTER
----- Message from Antonia Dotson sent at 3/6/2023 10:42 AM CST -----  Please put [facility] in, pt has apt on Mon. 3.13.23

## 2023-03-07 ENCOUNTER — LAB VISIT (OUTPATIENT)
Dept: LAB | Facility: HOSPITAL | Age: 58
End: 2023-03-07
Attending: INTERNAL MEDICINE
Payer: COMMERCIAL

## 2023-03-07 DIAGNOSIS — Z12.5 SCREENING PSA (PROSTATE SPECIFIC ANTIGEN): ICD-10-CM

## 2023-03-07 DIAGNOSIS — Z13.29 SCREENING FOR HYPOTHYROIDISM: ICD-10-CM

## 2023-03-07 DIAGNOSIS — Z00.00 WELLNESS EXAMINATION: ICD-10-CM

## 2023-03-07 DIAGNOSIS — E55.9 VITAMIN D DEFICIENCY: ICD-10-CM

## 2023-03-07 DIAGNOSIS — D50.9 IRON DEFICIENCY ANEMIA, UNSPECIFIED IRON DEFICIENCY ANEMIA TYPE: ICD-10-CM

## 2023-03-07 LAB
ALBUMIN SERPL-MCNC: 3.8 G/DL (ref 3.5–5)
ALBUMIN/GLOB SERPL: 1.2 RATIO (ref 1.1–2)
ALP SERPL-CCNC: 126 UNIT/L (ref 40–150)
ALT SERPL-CCNC: 8 UNIT/L (ref 0–55)
APPEARANCE UR: CLEAR
AST SERPL-CCNC: 16 UNIT/L (ref 5–34)
BACTERIA #/AREA URNS AUTO: NORMAL /HPF
BASOPHILS # BLD AUTO: 0.02 X10(3)/MCL (ref 0–0.2)
BASOPHILS NFR BLD AUTO: 0.3 %
BILIRUB UR QL STRIP.AUTO: NEGATIVE MG/DL
BILIRUBIN DIRECT+TOT PNL SERPL-MCNC: 0.5 MG/DL
BUN SERPL-MCNC: 18.3 MG/DL (ref 8.4–25.7)
CALCIUM SERPL-MCNC: 9.6 MG/DL (ref 8.4–10.2)
CHLORIDE SERPL-SCNC: 107 MMOL/L (ref 98–107)
CHOLEST SERPL-MCNC: 185 MG/DL
CHOLEST/HDLC SERPL: 5 {RATIO} (ref 0–5)
CO2 SERPL-SCNC: 26 MMOL/L (ref 22–29)
COLOR UR AUTO: YELLOW
CREAT SERPL-MCNC: 1.22 MG/DL (ref 0.73–1.18)
DEPRECATED CALCIDIOL+CALCIFEROL SERPL-MC: 51.2 NG/ML (ref 30–80)
EOSINOPHIL # BLD AUTO: 0.28 X10(3)/MCL (ref 0–0.9)
EOSINOPHIL NFR BLD AUTO: 4 %
ERYTHROCYTE [DISTWIDTH] IN BLOOD BY AUTOMATED COUNT: 14.3 % (ref 11.5–17)
EST. AVERAGE GLUCOSE BLD GHB EST-MCNC: 93.9 MG/DL
GFR SERPLBLD CREATININE-BSD FMLA CKD-EPI: >60 MLS/MIN/1.73/M2
GLOBULIN SER-MCNC: 3.2 GM/DL (ref 2.4–3.5)
GLUCOSE SERPL-MCNC: 92 MG/DL (ref 74–100)
GLUCOSE UR QL STRIP.AUTO: NEGATIVE MG/DL
HBA1C MFR BLD: 4.9 %
HCT VFR BLD AUTO: 40.3 % (ref 42–52)
HDLC SERPL-MCNC: 40 MG/DL (ref 35–60)
HGB BLD-MCNC: 13.5 G/DL (ref 14–18)
IMM GRANULOCYTES # BLD AUTO: 0.03 X10(3)/MCL (ref 0–0.04)
IMM GRANULOCYTES NFR BLD AUTO: 0.4 %
KETONES UR QL STRIP.AUTO: NEGATIVE MG/DL
LDLC SERPL CALC-MCNC: 124 MG/DL (ref 50–140)
LEUKOCYTE ESTERASE UR QL STRIP.AUTO: NEGATIVE UNIT/L
LYMPHOCYTES # BLD AUTO: 2.45 X10(3)/MCL (ref 0.6–4.6)
LYMPHOCYTES NFR BLD AUTO: 34.9 %
MCH RBC QN AUTO: 32.5 PG
MCHC RBC AUTO-ENTMCNC: 33.5 G/DL (ref 33–36)
MCV RBC AUTO: 97.1 FL (ref 80–94)
MONOCYTES # BLD AUTO: 0.79 X10(3)/MCL (ref 0.1–1.3)
MONOCYTES NFR BLD AUTO: 11.3 %
NEUTROPHILS # BLD AUTO: 3.45 X10(3)/MCL (ref 2.1–9.2)
NEUTROPHILS NFR BLD AUTO: 49.1 %
NITRITE UR QL STRIP.AUTO: NEGATIVE
NRBC BLD AUTO-RTO: 0 %
PH UR STRIP.AUTO: 5.5 [PH]
PLATELET # BLD AUTO: 287 X10(3)/MCL (ref 130–400)
PMV BLD AUTO: 10.2 FL (ref 7.4–10.4)
POTASSIUM SERPL-SCNC: 5.2 MMOL/L (ref 3.5–5.1)
PROT SERPL-MCNC: 7 GM/DL (ref 6.4–8.3)
PROT UR QL STRIP.AUTO: NEGATIVE MG/DL
PSA SERPL-MCNC: 0.53 NG/ML
RBC # BLD AUTO: 4.15 X10(6)/MCL (ref 4.7–6.1)
RBC #/AREA URNS AUTO: <5 /HPF
RBC UR QL AUTO: NEGATIVE UNIT/L
SODIUM SERPL-SCNC: 140 MMOL/L (ref 136–145)
SP GR UR STRIP.AUTO: 1.01 (ref 1–1.03)
SQUAMOUS #/AREA URNS AUTO: <5 /HPF
TRIGL SERPL-MCNC: 103 MG/DL (ref 34–140)
TSH SERPL-ACNC: 1.58 UIU/ML (ref 0.35–4.94)
UROBILINOGEN UR STRIP-ACNC: 0.2 MG/DL
VLDLC SERPL CALC-MCNC: 21 MG/DL
WBC # SPEC AUTO: 7 X10(3)/MCL (ref 4.5–11.5)
WBC #/AREA URNS AUTO: <5 /HPF

## 2023-03-07 PROCEDURE — 84443 ASSAY THYROID STIM HORMONE: CPT

## 2023-03-07 PROCEDURE — 82306 VITAMIN D 25 HYDROXY: CPT

## 2023-03-07 PROCEDURE — 80053 COMPREHEN METABOLIC PANEL: CPT

## 2023-03-07 PROCEDURE — 84153 ASSAY OF PSA TOTAL: CPT

## 2023-03-07 PROCEDURE — 81001 URINALYSIS AUTO W/SCOPE: CPT

## 2023-03-07 PROCEDURE — 85025 COMPLETE CBC W/AUTO DIFF WBC: CPT

## 2023-03-07 PROCEDURE — 36415 COLL VENOUS BLD VENIPUNCTURE: CPT

## 2023-03-07 PROCEDURE — 80061 LIPID PANEL: CPT

## 2023-03-07 PROCEDURE — 83036 HEMOGLOBIN GLYCOSYLATED A1C: CPT

## 2023-03-13 ENCOUNTER — OFFICE VISIT (OUTPATIENT)
Dept: INTERNAL MEDICINE | Facility: CLINIC | Age: 58
End: 2023-03-13
Payer: COMMERCIAL

## 2023-03-13 VITALS
DIASTOLIC BLOOD PRESSURE: 82 MMHG | OXYGEN SATURATION: 98 % | HEIGHT: 69 IN | WEIGHT: 165 LBS | TEMPERATURE: 97 F | HEART RATE: 75 BPM | RESPIRATION RATE: 16 BRPM | SYSTOLIC BLOOD PRESSURE: 134 MMHG | BODY MASS INDEX: 24.44 KG/M2

## 2023-03-13 DIAGNOSIS — Z72.0 TOBACCO USER: ICD-10-CM

## 2023-03-13 DIAGNOSIS — Z23 NEED FOR VACCINATION: ICD-10-CM

## 2023-03-13 DIAGNOSIS — Z72.0 TOBACCO ABUSE: ICD-10-CM

## 2023-03-13 DIAGNOSIS — Z78.9 ALCOHOL USE: ICD-10-CM

## 2023-03-13 DIAGNOSIS — I10 HYPERTENSION, UNSPECIFIED TYPE: ICD-10-CM

## 2023-03-13 DIAGNOSIS — Z00.00 ANNUAL PHYSICAL EXAM: Primary | ICD-10-CM

## 2023-03-13 PROBLEM — F10.90 ALCOHOL USE: Status: ACTIVE | Noted: 2023-03-13

## 2023-03-13 PROCEDURE — 1159F PR MEDICATION LIST DOCUMENTED IN MEDICAL RECORD: ICD-10-PCS | Mod: CPTII,95,, | Performed by: INTERNAL MEDICINE

## 2023-03-13 PROCEDURE — 3008F BODY MASS INDEX DOCD: CPT | Mod: CPTII,95,, | Performed by: INTERNAL MEDICINE

## 2023-03-13 PROCEDURE — 3079F PR MOST RECENT DIASTOLIC BLOOD PRESSURE 80-89 MM HG: ICD-10-PCS | Mod: CPTII,95,, | Performed by: INTERNAL MEDICINE

## 2023-03-13 PROCEDURE — 1160F PR REVIEW ALL MEDS BY PRESCRIBER/CLIN PHARMACIST DOCUMENTED: ICD-10-PCS | Mod: CPTII,95,, | Performed by: INTERNAL MEDICINE

## 2023-03-13 PROCEDURE — 99396 PREV VISIT EST AGE 40-64: CPT | Mod: 95,25,, | Performed by: INTERNAL MEDICINE

## 2023-03-13 PROCEDURE — 3075F PR MOST RECENT SYSTOLIC BLOOD PRESS GE 130-139MM HG: ICD-10-PCS | Mod: CPTII,95,, | Performed by: INTERNAL MEDICINE

## 2023-03-13 PROCEDURE — 4010F PR ACE/ARB THEARPY RXD/TAKEN: ICD-10-PCS | Mod: CPTII,95,, | Performed by: INTERNAL MEDICINE

## 2023-03-13 PROCEDURE — 3079F DIAST BP 80-89 MM HG: CPT | Mod: CPTII,95,, | Performed by: INTERNAL MEDICINE

## 2023-03-13 PROCEDURE — 99396 PR PREVENTIVE VISIT,EST,40-64: ICD-10-PCS | Mod: 95,25,, | Performed by: INTERNAL MEDICINE

## 2023-03-13 PROCEDURE — 1159F MED LIST DOCD IN RCRD: CPT | Mod: CPTII,95,, | Performed by: INTERNAL MEDICINE

## 2023-03-13 PROCEDURE — 3075F SYST BP GE 130 - 139MM HG: CPT | Mod: CPTII,95,, | Performed by: INTERNAL MEDICINE

## 2023-03-13 PROCEDURE — 4010F ACE/ARB THERAPY RXD/TAKEN: CPT | Mod: CPTII,95,, | Performed by: INTERNAL MEDICINE

## 2023-03-13 PROCEDURE — 3008F PR BODY MASS INDEX (BMI) DOCUMENTED: ICD-10-PCS | Mod: CPTII,95,, | Performed by: INTERNAL MEDICINE

## 2023-03-13 PROCEDURE — 1160F RVW MEDS BY RX/DR IN RCRD: CPT | Mod: CPTII,95,, | Performed by: INTERNAL MEDICINE

## 2023-03-13 RX ORDER — TAMSULOSIN HYDROCHLORIDE 0.4 MG/1
1 CAPSULE ORAL DAILY
COMMUNITY
Start: 2023-03-01 | End: 2024-03-18 | Stop reason: SDUPTHER

## 2023-03-13 NOTE — PROGRESS NOTES
Subjective:      Patient ID: Satya Hilliard is a 57 y.o. male.    Chief Complaint: Annual Exam      HPI:  57 year old  male here for wellness  History of hypertension   Dr. Lama for his prostate; sees him for PSA checks and physicals   Shu Johnson for colonoscopy   He is a smoker and has been a smoker for the age of 18 to present and smokes currently 1-1/2 packs per day   low-dose screening CT scan of the chest 2/2022   He is a    [place] 10 year cardiovascular risk score of 24% which is high risk--recent him to cardiology for cardiovascular workup which was negative. 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  Needs flu vaccine this season  Next year he needs PCV 20    Past Medical History:  Past Medical History:   Diagnosis Date    Depression     Hypertriglyceridemia     Personal history of colonic polyps 05/17/2021    Colonoscopy Report    Tobacco user      Past Surgical History:   Procedure Laterality Date    COLONOSCOPY  05/17/2021    Leeroy Amaya MD    HERNIA REPAIR  1989     Review of patient's allergies indicates:  No Known Allergies  Current Outpatient Medications on File Prior to Visit   Medication Sig Dispense Refill    amlodipine-olmesartan (KEITH) 5-40 mg per tablet TAKE ONE TABLET BY MOUTH ONE TIME DAILY 90 tablet 0    citalopram (CELEXA) 40 MG tablet Take 40 mg by mouth once daily.      sildenafiL (VIAGRA) 50 MG tablet Take 50 mg by mouth as needed.      tamsulosin (FLOMAX) 0.4 mg Cap Take 1 capsule by mouth once daily.       No current facility-administered medications on file prior to visit.     Social History     Socioeconomic History    Marital status:    Tobacco Use    Smoking status: Every Day      "Packs/day: 1.00     Years: 30.00     Pack years: 30.00     Types: Cigarettes    Smokeless tobacco: Never   Substance and Sexual Activity    Alcohol use: Not Currently     Alcohol/week: 24.0 standard drinks     Types: 24 Cans of beer per week    Drug use: Never    Sexual activity: Yes     Partners: Female     Birth control/protection: None     Family History   Problem Relation Age of Onset    Hypertension Mother     Bone cancer Father        Review of Systems  A comprehensive review of systems was performed and was negative with exception of what is documented above.     Objective:   /82 (BP Location: Left arm, Patient Position: Sitting, BP Method: Medium (Manual))   Pulse 75   Temp 97.3 °F (36.3 °C) (Temporal)   Resp 16   Ht 5' 9" (1.753 m)   Wt 74.8 kg (165 lb)   SpO2 98%   BMI 24.37 kg/m²   Physical Exam  General : Alert and oriented, No acute distress, afebrile.  Eye : PERRLA. EOMI. Normal conjunctiva  HEENT : Normocephalic/ atraumatic, Normal hearing, Oral mucosa is moist.  Respiratory : Respirations are non-labored and clear to auscultation bilaterally. Symmetrical air entry bilaterally, no crackles, no wheezes, no rhonchi. No cyanosis, no clubbing.  Cardiovascular : Normal rate, Regular rhythm. No murmurs, rubs, or gallops. Pulses are 2+ throughout. No JVD. No Edema.  Gastrointestinal : Soft, nontender, non-distended, bowel sounds are present in all quadrants, no organomegaly, no guarding, no rebound.  Musculoskeletal : Normal range of motion throughout. No muscle tenderness.  Integumentary : Warm, moist, intact.  Neurologic : Alert, Oriented  Psychiatric : Cooperative, Appropriate mood & affect.   Assessment/ Plan:   1. Annual physical exam  Assessment & Plan:  General health maintenance education given, labs reviewed.  Clinically stable.  Needs flu vaccine today  Schedule CT low-dose screening of the chest  Advised on alcohol cessation  RTC 6 months for hypertension and hyperlipidemic " revisit      2. Tobacco abuse  -     CT Chest Lung Screening Low Dose; Future; Expected date: 03/13/2023    3. Alcohol use    4. Hypertension, unspecified type    5. Elevated LDL cholesterol level    6. Tobacco user    7. Need for vaccination  -     Influenza - Quadrivalent (PF)             Follow up for BP CHECK, with labs prior to visit, NURSE PRACTITIONER.

## 2023-04-03 ENCOUNTER — TELEPHONE (OUTPATIENT)
Dept: INTERNAL MEDICINE | Facility: CLINIC | Age: 58
End: 2023-04-03
Payer: COMMERCIAL

## 2023-04-03 ENCOUNTER — HOSPITAL ENCOUNTER (OUTPATIENT)
Dept: RADIOLOGY | Facility: HOSPITAL | Age: 58
Discharge: HOME | End: 2023-04-03
Attending: INTERNAL MEDICINE
Payer: COMMERCIAL

## 2023-04-03 DIAGNOSIS — Z72.0 TOBACCO ABUSE: ICD-10-CM

## 2023-04-03 PROCEDURE — 71271 CT THORAX LUNG CANCER SCR C-: CPT | Mod: TC

## 2023-04-03 NOTE — PROGRESS NOTES
Please inform patient of CT results.    1. No signs of lung cancer. Repeat in 1 year.     Thanks for all you do,    Keenan

## 2023-04-03 NOTE — TELEPHONE ENCOUNTER
----- Message from CARMEN Tubbs sent at 4/3/2023 12:57 PM CDT -----  Please inform patient of CT results.    1. No signs of lung cancer. Repeat in 1 year.     Thanks for all you do,    Keenan

## 2023-05-08 DIAGNOSIS — E78.00 ELEVATED LDL CHOLESTEROL LEVEL: ICD-10-CM

## 2023-05-08 DIAGNOSIS — F41.9 ANXIETY: Primary | ICD-10-CM

## 2023-05-08 RX ORDER — CITALOPRAM 40 MG/1
40 TABLET, FILM COATED ORAL DAILY
Qty: 90 TABLET | Refills: 3 | Status: SHIPPED | OUTPATIENT
Start: 2023-05-08 | End: 2024-03-18

## 2023-05-08 RX ORDER — GEMFIBROZIL 600 MG/1
600 TABLET, FILM COATED ORAL
Qty: 180 TABLET | Refills: 3 | Status: SHIPPED | OUTPATIENT
Start: 2023-05-08 | End: 2024-03-18 | Stop reason: SDUPTHER

## 2023-05-08 NOTE — TELEPHONE ENCOUNTER
----- Message from Shantel Madeline sent at 5/8/2023 10:44 AM CDT -----  Regarding: refill  Type:  RX Refill Request    Who Called: pt's wife  Refill or New Rx:refill   RX Name and Strength:gemfibrozil  600mg  How is the patient currently taking it? (ex. 1XDay):2x day  Is this a 30 day or 90 day RX:  Refill or New Rx:refill  RX Name and Strength:citalopram (CELEXA) 40 MG tablet  How is the patient currently taking it? (ex. 1XDay):1 day  Is this a 30 day or 90 day RX:    Preferred Pharmacy with phone number:[email or web address]  Local or Mail Order:local  Ordering Provider:lilli limon  Would the patient rather a call back or a response via [facility]? C/b  Best Call Back Number:[phone]  Additional Information: please change pharmacy all scripts s/b [facility]

## 2023-05-25 ENCOUNTER — TELEPHONE (OUTPATIENT)
Dept: INTERNAL MEDICINE | Facility: CLINIC | Age: 58
End: 2023-05-25
Payer: COMMERCIAL

## 2023-05-25 DIAGNOSIS — I10 HYPERTENSION, UNSPECIFIED TYPE: ICD-10-CM

## 2023-05-25 RX ORDER — AMLODIPINE AND OLMESARTAN MEDOXOMIL 5; 40 MG/1; MG/1
1 TABLET ORAL DAILY
Qty: 90 TABLET | Refills: 0 | Status: SHIPPED | OUTPATIENT
Start: 2023-05-25 | End: 2023-12-13

## 2023-05-25 NOTE — TELEPHONE ENCOUNTER
----- Message from Chiara Fenton sent at 5/25/2023 11:05 AM CDT -----  .Type:  RX Refill Request    Who Called: pt  Refill or New Rx:refill  RX Name and Strength:amlodipine-olmesartan (KEITH) 5-40 mg per tablet  How is the patient currently taking it? (ex. 1XDay):TAKE ONE TABLET BY MOUTH ONE TIME DAILY  Is this a 30 day or 90 day RX: 90  Preferred Pharmacy with phone number:[facility] [place], [address]  Local or Mail Order:local   Ordering Provider:Vanda  Would the patient rather a call back or a response via [facility]? Call back   Best Call Back Number:[phone]  Additional Information:

## 2023-06-12 PROBLEM — Z00.00 ANNUAL PHYSICAL EXAM: Status: RESOLVED | Noted: 2023-03-13 | Resolved: 2023-06-12

## 2023-06-26 ENCOUNTER — TELEPHONE (OUTPATIENT)
Dept: INTERNAL MEDICINE | Facility: CLINIC | Age: 58
End: 2023-06-26
Payer: COMMERCIAL

## 2023-06-26 NOTE — TELEPHONE ENCOUNTER
Spoke to Garrett SILVER from [employer] in regards to Appeal for pt's Amlodipine-Olmesartan 5-40 mg. He stated that PA was approved on 6/21/23 - 6/21/24. PA Case #: [id number]. They are faxing over Approval letter as well.

## 2023-10-05 NOTE — ASSESSMENT & PLAN NOTE
General health maintenance education given, labs reviewed.  Clinically stable.  Needs flu vaccine today  Schedule CT low-dose screening of the chest  Advised on alcohol cessation  RTC 6 months for hypertension and hyperlipidemic revisit   Reason for follow up: Pre op PPM/ bradycardia, PVCs        Impression:   Symptomatic bradycardia  ? Scheduled for pacemaker implant 10/10/23  Symptomatic drug refractory PVCs S/P RF ablation of coronary cusp PVC 12/2016  ? Symptomatic with palpitations and SOB on exertion, symptoms improved post ablation.   ? Prior attempted ablation of PVCs 12/15/15. Focus could not be precisely localized (LVOT and RVOT mapped).  ? Flecainide and verapamil ineffective at symptom relief, metoprolol caused bradycardia  ? Dofetilide caused QT prolongation without significant suppression of PVC's (10/26/16 - 10/28/16)   ? Sotalol ineffective (12/15/15-1/19/16)  ? S/p RF ablation of coronary cusp PVC 12/2016 (Dr. Gonzalez)  ? Seen back in office 6/17/19 as felt recurrence of palpitations, low burden of PACs and PVCs on Holter  ? O.V. 4/28/22: In SR. Patient notes fatigue and palpitations with activity. Recommended echo. Recommended MCOT if palpitations persistent.  ? 30d Cardiac Monitor 11/3-12/2/22: SR, HR's  bpm, avg 56 bpm. PAC burden 2%. PVC burden <1%. 36 patient reported episodes correlating with SR with intermittent PAC, PVC, non-sustained SVT /atrial runs.  ? O.V. 1/2023: initiated on diltiazem 15 mg TID for symptomatic palpitations.   ? O.V. 6/13/23 had significant worsening of symptoms, unable to increase diltiazem d/t SB. Initiated propafenone. And 14d Holter in 1 week. If no symptom improvement consider PPM to titrate up diltiazem  ? 14d Holter 6/2023: PAC burden 1%. PVC burden <1%, 33 second strip noting AIVR, rate 98 bpm, possibly SR w/ aberration. 12 reported symptoms correlating with SR. Does note improvement of symptoms on propafenone, however notes it is getting expensive and would like to discuss other options.  High Risk Medication on Propafenone 225 mg BID  ? Initiated 6/13/23  Obstructive sleep apnea, no longer requiring CPAP after gastric bypass surgery   ? Offered to refer patient back to sleep medicine several  times, but has kindly declined  Cardiac Monitoring  ? Stress test 5/09/23: no ischemia or infarct, EF 61%  ? Echo 6/7/22: EF 69%. IVS 1 cm. JORGE 32.3 ml/m².      Device implant was discussed in detail. The procedure, benefits, alternatives, and risks including but not limited to infection, bleeding, pneumothorax, damage to organs/structures in the chest (including heart, lungs, nerves around the heart), lead dislodgement, extremely small risk of major complications (including myocardial infarction, stroke and death) were explained to the patient. We talked about long term device follow up, risk of lead and device malfunction, need for generator changes. All questions were answered. The patient voiced understanding and wished to  proceed.         Recommendations:   · Discussed propafenone could be increasing her fatigue, we reviewed this medication and it's possible side effects in detail.   · Her arrhythmia symptoms have improved significantly. Propafenone is likely contributing to some of her symptoms.  · We discussed that pacemaker will improve symptoms related to symptomatic bradycardia and chronotropic incompetence, but not symptoms related to deconditioning etc. She verbalized understanding and agrees to proceed.   · Proceed as scheduled with pacemaker implant on 10/10/23 in the setting of bradycardia.  Risks, benefits, alternatives reviewed with patient. Questions invited and answered. Patient agrees with treatment plan, verbalizes understanding and would like to proceed as noted above.   Provided patient with Hibiclens and educated on use at prior visit  · NPO at midnight prior to procedure  · Follow medication holding guidelines per letter mailed to patient.   · Follow up post operatively as scheduled       Postoperative device restrictions, activity and pain management:    FIRST 24 HOURS  Check your blood pressure, heart rate after your discharge and the next morning. If you have any symptoms, check your  heart rate and blood pressure. Please contact office ([phone]) with any concerns.       MONITORING  Monitor incision site for signs of bleeding, such as increased swelling or bruising. Monitor incision for signs of infection, such as drainage, redness, or warmth at the site.     You may experience some minor soreness in the chest in the next week or so. May use Tylenol as directed.     Use ice pack as needed for up to 15 minutes at a time for incisional discomfort. Do not sleep with ice pack.    SHOWER  You may shower 48 hours after the procedure with the Mepilex dressing on.     Keep wound sites clean and dry.  Mepilex dressing will be removed after 1 week.    No tub baths, direct water or hot tub until incision is completely healed (usually 1 month).      ACTIVITY/WORK  No driving for 2 days (this includes lawn mowing).     No lifting of affected arm/elbow above shoulder level for 4 weeks.     No lifting greater than 10 pounds with affected arm for 1 week, 50 pounds for 4 weeks.      Do not wear arm sling continuously. If you sleep with affected arm above shoulder level, then only wear the arm sling at night.     How soon you can return to work depends upon your job duties. If you are not able to adhere to the above recommendations or your job is not able to make accommodations to follow these recommendations, you will then be asked to refrain from working for 1 week.  If you do very heavy lifting, it is possible you may need to refrain from working for up to 1 month.     Not adhering to above instructions can place you at risk for complications.      Device implant was discussed in detail. The procedure, benefits, alternatives, and risks including but not limited to infection, bleeding, pneumothorax, hemothorax, tamponade, neurovascular injury, pericarditis, embolic events, kidney injury, emergent need for surgery,  myocardial infarction, stroke and death were explained to the patient. The patient's  questions were answered. The patient voiced understanding and wishes to proceed.    HPI: Janee Pavon is a 67 year old female seen in clinic for a pre operative exam. She is scheduled to undergo a pacemaker implant on 10/10/23 in the setting of symptomatic bradycardia. Past medical history reviewed and summarized.     In office today, she is feeling more fatigued. She notes she is worse with her fatigue since last year, this can occur with exertion and at rest. Her palpitations have improved. We also discussed her propafenone in that it can cause her fatigue as well.  She did have a tube removed from her right ear due to infection, she had antibiotics for this which was completed on 10/4/23.   We reviewed her bradycardia and pacemaker implant procedure in detail- also discussed that as fatigue occurs at rest this can be a side effect from her medication and pacemaker will not help with this. Also discussed need for future generator changes.   She is unaccompanied in office.           PAST MEDICAL HX:    PVCs (premature ventricular contractions)                     Obesity                                                       Sarcoidosis                                                   Hyperlipidemia                                                Restless leg syndrome                                         Sciatica                                                      Fibromyalgia                                                  Urinary incontinence                                          PAUL (obstructive sleep apnea)                                   Comment: no longer needs CPAP    Pessary maintenance                                             Comment: #3 ring pessary with support/ not using now    History of shingles                                           Gastroesophageal reflux disease                               Urinary tract infection                                       PONV (postoperative nausea  and vomiting)                        Comment: ear patch helped along with IV meds    Pneumonia                                       11/2014       PVD (posterior vitreous detachment), left eye   11/2014       Abdominal hernia                                              Glaucoma                                        09/2016       Ovarian cancer (CMD)                                          Chronic pain                                                    Comment: Back    Arthritis                                                     Cataract                                                      Allergies and Medications were reviewed    ROS: Above review of system completed by nursing staff and reviewed by provider. Pertinent items are noted in the history of present illness (HPI).     PHYSICAL EXAM:  Visit Vitals  /86 (BP Location: LUE - Left upper extremity, Patient Position: Sitting, Cuff Size: Regular)   Pulse (!) 52   Ht 5' 2\" (1.575 m)   Wt 78.7 kg (173 lb 9.6 oz)   SpO2 98%   BMI 31.75 kg/m²     GENERAL:  Cooperative, sitting comfortably, in no acute distress.  HEAD: Normocephalic, Non-traumatic  NECK: No masses  CARDIOVASCULAR:   Regular rate and rhythm.  RESPIRATORY:  No respiratory distress, audible wheeze, tripoding.  EXTREMITIES: No clubbing or cyanosis. No pitting edema.  NEURO:  Alert and oriented to person, place, and time. No obvious motor deficits  PSYCHIATRIC:  Mood and affect normal.   INTEGUMENTARY:  Warm, no rashes or nodules.      I have personally reviewed and interpreted EKG below.  I have reviewed and summarized old records as per the impression section.    EKG: Sinus bradycardia with sinus arrhythmia     Labs:   Lab Results   Component Value Date    WBC 6.6 01/12/2023    WBC 10.3 01/30/2020    HGB 14.8 01/12/2023    HGB 12.5 01/30/2020    HCT 45.8 01/12/2023    HCT 38.5 01/30/2020     01/12/2023     01/30/2020     04/19/2013    INR 1.0 12/01/2012    POTASSIUM 4.7  01/12/2023    POTASSIUM 4.1 01/30/2020    BUN 15 01/12/2023    BUN 13 01/30/2020    CREATININE 0.81 01/12/2023    CREATININE 0.93 01/30/2020    BNP 31 10/18/2017    TSH 2.254 09/11/2023    TSH 2.014 05/21/2019         On 10/05/23, IMajo LPN, scribed the services personally performed by Dr. Dias.    During this visit I, Aguilar CARRASCO [facility], obtained the history, performed the exam and developed the impression and plan.   I confirmed with the patient the review of systems, past history, family history, social history and medication history that were originally obtained by the nurse/scribe.  Prior records from multiple sources were reviewed and summarized in this note.  All of this has been recorded here as a scribed note by the nurse/scribe who performed the duties of a scribe for this encounter in my presence.

## 2023-12-13 DIAGNOSIS — I10 HYPERTENSION, UNSPECIFIED TYPE: ICD-10-CM

## 2023-12-13 RX ORDER — AMLODIPINE AND OLMESARTAN MEDOXOMIL 5; 40 MG/1; MG/1
1 TABLET ORAL DAILY
Qty: 90 TABLET | Refills: 0 | Status: SHIPPED | OUTPATIENT
Start: 2023-12-13 | End: 2024-03-18

## 2024-03-04 ENCOUNTER — TELEPHONE (OUTPATIENT)
Dept: INTERNAL MEDICINE | Facility: CLINIC | Age: 59
End: 2024-03-04
Payer: COMMERCIAL

## 2024-03-04 DIAGNOSIS — Z00.00 WELLNESS EXAMINATION: Primary | ICD-10-CM

## 2024-03-04 DIAGNOSIS — Z13.29 SCREENING FOR HYPOTHYROIDISM: ICD-10-CM

## 2024-03-04 DIAGNOSIS — E55.9 VITAMIN D DEFICIENCY: ICD-10-CM

## 2024-03-04 NOTE — TELEPHONE ENCOUNTER
1. Are there any outstanding tasks in the patient's chart? Yes, fasting labs and EKG    2. Is there any documentation in the chart? No    3.Has patient been seen in an ER, Urgent care clinic, or been admitted since last visit?  If yes, When, where, and why    4. Has patient seen any other healthcare providers since last visit?  If yes, when, where, and why    5. Has patient had any bloodwork or XR done since last visit?    6. Is patient signed up for patient portal?    Spoke to pt. Pt Verbally confirmed understanding.

## 2024-03-15 DIAGNOSIS — I10 HYPERTENSION, UNSPECIFIED TYPE: ICD-10-CM

## 2024-03-18 ENCOUNTER — OFFICE VISIT (OUTPATIENT)
Dept: INTERNAL MEDICINE | Facility: CLINIC | Age: 59
End: 2024-03-18
Payer: COMMERCIAL

## 2024-03-18 ENCOUNTER — LAB VISIT (OUTPATIENT)
Dept: LAB | Facility: HOSPITAL | Age: 59
End: 2024-03-18
Attending: INTERNAL MEDICINE
Payer: COMMERCIAL

## 2024-03-18 VITALS
WEIGHT: 155 LBS | BODY MASS INDEX: 22.96 KG/M2 | OXYGEN SATURATION: 97 % | HEART RATE: 73 BPM | TEMPERATURE: 98 F | DIASTOLIC BLOOD PRESSURE: 82 MMHG | SYSTOLIC BLOOD PRESSURE: 120 MMHG | HEIGHT: 69 IN | RESPIRATION RATE: 16 BRPM

## 2024-03-18 DIAGNOSIS — Z72.0 TOBACCO ABUSE: ICD-10-CM

## 2024-03-18 DIAGNOSIS — Z00.00 ANNUAL PHYSICAL EXAM: Primary | ICD-10-CM

## 2024-03-18 DIAGNOSIS — Z00.00 WELLNESS EXAMINATION: ICD-10-CM

## 2024-03-18 DIAGNOSIS — E55.9 VITAMIN D DEFICIENCY: ICD-10-CM

## 2024-03-18 DIAGNOSIS — F32.0 CURRENT MILD EPISODE OF MAJOR DEPRESSIVE DISORDER WITHOUT PRIOR EPISODE: ICD-10-CM

## 2024-03-18 DIAGNOSIS — E78.00 ELEVATED LDL CHOLESTEROL LEVEL: ICD-10-CM

## 2024-03-18 DIAGNOSIS — I20.89 STABLE ANGINA PECTORIS: ICD-10-CM

## 2024-03-18 DIAGNOSIS — F41.9 ANXIETY: ICD-10-CM

## 2024-03-18 DIAGNOSIS — I10 HYPERTENSION, UNSPECIFIED TYPE: ICD-10-CM

## 2024-03-18 DIAGNOSIS — Z13.29 SCREENING FOR HYPOTHYROIDISM: ICD-10-CM

## 2024-03-18 LAB
ALBUMIN SERPL-MCNC: 4.1 G/DL (ref 3.5–5)
ALBUMIN/GLOB SERPL: 1.2 RATIO (ref 1.1–2)
ALP SERPL-CCNC: 143 UNIT/L (ref 40–150)
ALT SERPL-CCNC: 11 UNIT/L (ref 0–55)
APPEARANCE UR: CLEAR
AST SERPL-CCNC: 29 UNIT/L (ref 5–34)
BACTERIA #/AREA URNS AUTO: ABNORMAL /HPF
BASOPHILS # BLD AUTO: 0.03 X10(3)/MCL
BASOPHILS NFR BLD AUTO: 0.4 %
BILIRUB SERPL-MCNC: 0.2 MG/DL
BILIRUB UR QL STRIP.AUTO: NEGATIVE
BUN SERPL-MCNC: 19 MG/DL (ref 8.4–25.7)
CALCIUM SERPL-MCNC: 9.4 MG/DL (ref 8.4–10.2)
CHLORIDE SERPL-SCNC: 105 MMOL/L (ref 98–107)
CHOLEST SERPL-MCNC: 174 MG/DL
CHOLEST/HDLC SERPL: 5 {RATIO} (ref 0–5)
CO2 SERPL-SCNC: 25 MMOL/L (ref 22–29)
COLOR UR AUTO: ABNORMAL
CREAT SERPL-MCNC: 0.91 MG/DL (ref 0.73–1.18)
DEPRECATED CALCIDIOL+CALCIFEROL SERPL-MC: 32.4 NG/ML (ref 30–80)
EOSINOPHIL # BLD AUTO: 0.27 X10(3)/MCL (ref 0–0.9)
EOSINOPHIL NFR BLD AUTO: 3.4 %
ERYTHROCYTE [DISTWIDTH] IN BLOOD BY AUTOMATED COUNT: 12.7 % (ref 11.5–17)
EST. AVERAGE GLUCOSE BLD GHB EST-MCNC: 99.7 MG/DL
GFR SERPLBLD CREATININE-BSD FMLA CKD-EPI: >60 MLS/MIN/1.73/M2
GLOBULIN SER-MCNC: 3.3 GM/DL (ref 2.4–3.5)
GLUCOSE SERPL-MCNC: 101 MG/DL (ref 74–100)
GLUCOSE UR QL STRIP.AUTO: NORMAL
HBA1C MFR BLD: 5.1 %
HCT VFR BLD AUTO: 42.7 % (ref 42–52)
HDLC SERPL-MCNC: 33 MG/DL (ref 35–60)
HGB BLD-MCNC: 14 G/DL (ref 14–18)
IMM GRANULOCYTES # BLD AUTO: 0.02 X10(3)/MCL (ref 0–0.04)
IMM GRANULOCYTES NFR BLD AUTO: 0.3 %
KETONES UR QL STRIP.AUTO: NEGATIVE
LDLC SERPL CALC-MCNC: 77 MG/DL (ref 50–140)
LEUKOCYTE ESTERASE UR QL STRIP.AUTO: NEGATIVE
LYMPHOCYTES # BLD AUTO: 2.24 X10(3)/MCL (ref 0.6–4.6)
LYMPHOCYTES NFR BLD AUTO: 28.1 %
MCH RBC QN AUTO: 32.8 PG (ref 27–31)
MCHC RBC AUTO-ENTMCNC: 32.8 G/DL (ref 33–36)
MCV RBC AUTO: 100 FL (ref 80–94)
MONOCYTES # BLD AUTO: 0.81 X10(3)/MCL (ref 0.1–1.3)
MONOCYTES NFR BLD AUTO: 10.2 %
MUCOUS THREADS URNS QL MICRO: ABNORMAL /LPF
NEUTROPHILS # BLD AUTO: 4.6 X10(3)/MCL (ref 2.1–9.2)
NEUTROPHILS NFR BLD AUTO: 57.6 %
NITRITE UR QL STRIP.AUTO: NEGATIVE
NRBC BLD AUTO-RTO: 0 %
PH UR STRIP.AUTO: 6.5 [PH]
PLATELET # BLD AUTO: 349 X10(3)/MCL (ref 130–400)
PMV BLD AUTO: 9.7 FL (ref 7.4–10.4)
POTASSIUM SERPL-SCNC: 4.8 MMOL/L (ref 3.5–5.1)
PROT SERPL-MCNC: 7.4 GM/DL (ref 6.4–8.3)
PROT UR QL STRIP.AUTO: NEGATIVE
PSA SERPL-MCNC: 0.6 NG/ML
RBC # BLD AUTO: 4.27 X10(6)/MCL (ref 4.7–6.1)
RBC #/AREA URNS AUTO: ABNORMAL /HPF
RBC UR QL AUTO: NEGATIVE
SODIUM SERPL-SCNC: 140 MMOL/L (ref 136–145)
SP GR UR STRIP.AUTO: 1.02 (ref 1–1.03)
SQUAMOUS #/AREA URNS LPF: ABNORMAL /HPF
TRIGL SERPL-MCNC: 318 MG/DL (ref 34–140)
TSH SERPL-ACNC: 2.2 UIU/ML (ref 0.35–4.94)
UROBILINOGEN UR STRIP-ACNC: NORMAL
VLDLC SERPL CALC-MCNC: 64 MG/DL
WBC # SPEC AUTO: 7.97 X10(3)/MCL (ref 4.5–11.5)
WBC #/AREA URNS AUTO: ABNORMAL /HPF

## 2024-03-18 PROCEDURE — 82306 VITAMIN D 25 HYDROXY: CPT

## 2024-03-18 PROCEDURE — 93005 ELECTROCARDIOGRAM TRACING: CPT

## 2024-03-18 PROCEDURE — 80053 COMPREHEN METABOLIC PANEL: CPT

## 2024-03-18 PROCEDURE — 93010 ELECTROCARDIOGRAM REPORT: CPT | Mod: ,,, | Performed by: INTERNAL MEDICINE

## 2024-03-18 PROCEDURE — 84443 ASSAY THYROID STIM HORMONE: CPT

## 2024-03-18 PROCEDURE — 80061 LIPID PANEL: CPT

## 2024-03-18 PROCEDURE — 3074F SYST BP LT 130 MM HG: CPT | Mod: CPTII,,, | Performed by: INTERNAL MEDICINE

## 2024-03-18 PROCEDURE — 1160F RVW MEDS BY RX/DR IN RCRD: CPT | Mod: CPTII,,, | Performed by: INTERNAL MEDICINE

## 2024-03-18 PROCEDURE — 84153 ASSAY OF PSA TOTAL: CPT

## 2024-03-18 PROCEDURE — 99396 PREV VISIT EST AGE 40-64: CPT | Mod: ,,, | Performed by: INTERNAL MEDICINE

## 2024-03-18 PROCEDURE — 36415 COLL VENOUS BLD VENIPUNCTURE: CPT

## 2024-03-18 PROCEDURE — 3044F HG A1C LEVEL LT 7.0%: CPT | Mod: CPTII,,, | Performed by: INTERNAL MEDICINE

## 2024-03-18 PROCEDURE — 1159F MED LIST DOCD IN RCRD: CPT | Mod: CPTII,,, | Performed by: INTERNAL MEDICINE

## 2024-03-18 PROCEDURE — 4010F ACE/ARB THERAPY RXD/TAKEN: CPT | Mod: CPTII,,, | Performed by: INTERNAL MEDICINE

## 2024-03-18 PROCEDURE — 83036 HEMOGLOBIN GLYCOSYLATED A1C: CPT

## 2024-03-18 PROCEDURE — 3008F BODY MASS INDEX DOCD: CPT | Mod: CPTII,,, | Performed by: INTERNAL MEDICINE

## 2024-03-18 PROCEDURE — 81001 URINALYSIS AUTO W/SCOPE: CPT

## 2024-03-18 PROCEDURE — 85025 COMPLETE CBC W/AUTO DIFF WBC: CPT

## 2024-03-18 PROCEDURE — 3079F DIAST BP 80-89 MM HG: CPT | Mod: CPTII,,, | Performed by: INTERNAL MEDICINE

## 2024-03-18 RX ORDER — VILAZODONE HYDROCHLORIDE 20 MG/1
20 TABLET ORAL DAILY
Qty: 30 TABLET | Refills: 5 | Status: SHIPPED | OUTPATIENT
Start: 2024-03-18 | End: 2025-03-18

## 2024-03-18 RX ORDER — CITALOPRAM 40 MG/1
40 TABLET, FILM COATED ORAL DAILY
Qty: 90 TABLET | Refills: 3 | Status: CANCELLED | OUTPATIENT
Start: 2024-03-18

## 2024-03-18 RX ORDER — AMLODIPINE AND OLMESARTAN MEDOXOMIL 5; 40 MG/1; MG/1
1 TABLET ORAL
Qty: 90 TABLET | Refills: 0 | Status: SHIPPED | OUTPATIENT
Start: 2024-03-18 | End: 2024-03-18 | Stop reason: SDUPTHER

## 2024-03-18 RX ORDER — AMLODIPINE AND OLMESARTAN MEDOXOMIL 5; 40 MG/1; MG/1
1 TABLET ORAL DAILY
Qty: 90 TABLET | Refills: 3 | Status: SHIPPED | OUTPATIENT
Start: 2024-03-18

## 2024-03-18 RX ORDER — CITALOPRAM 40 MG/1
TABLET, FILM COATED ORAL
Qty: 90 TABLET | Refills: 3
Start: 2024-03-18 | End: 2024-05-31

## 2024-03-18 RX ORDER — TAMSULOSIN HYDROCHLORIDE 0.4 MG/1
1 CAPSULE ORAL DAILY
Qty: 90 CAPSULE | Refills: 3 | Status: SHIPPED | OUTPATIENT
Start: 2024-03-18

## 2024-03-18 RX ORDER — VILAZODONE HYDROCHLORIDE 10 MG-20MG
KIT ORAL
Qty: 30 TABLET | Refills: 0 | Status: SHIPPED | OUTPATIENT
Start: 2024-03-18 | End: 2024-05-31 | Stop reason: ALTCHOICE

## 2024-03-18 RX ORDER — GEMFIBROZIL 600 MG/1
600 TABLET, FILM COATED ORAL
Qty: 180 TABLET | Refills: 3 | Status: SHIPPED | OUTPATIENT
Start: 2024-03-18 | End: 2025-03-18

## 2024-03-18 NOTE — ASSESSMENT & PLAN NOTE
Reports occasional chest pain no associated shortness a breath or diaphoresis or nausea had cardiovascular workup within the past 5 years which was normal but has a lot of cardiovascular risk factors, still actively smoking.  Update low-dose CT of the chest.

## 2024-03-18 NOTE — ASSESSMENT & PLAN NOTE
General health maintenance education given, labs reviewed and stable age-appropriate exams are up-to-date with the exception of low-dose screening CT of the chest which we will order

## 2024-03-18 NOTE — PROGRESS NOTES
Internal Medicine    Patient ID: [id number]     Chief Complaint: Annual Exam      HPI:     Satya Hilliard is a 58 y.o. male here today for an annual wellness visit. Reviewed and discussed lab results.     Hypertension, Depression   Dr. Lama for his prostate; sees him for PSA checks and physicals   Shu Johnson for colonoscopy   He is a smoker and has been a smoker for the age of 18 to present and smokes currently 1-1/2 packs per day   low-dose screening CT scan of the chest needs to be updated  He is a    [place] 10 year cardiovascular risk score of 24% which is high risk--recent him to cardiology for cardiovascular workup which was negative. He is on aspirin 81mg, blood pressure at goal on current regimen   Patient is drinking 6-8 beers a day; more on the weekend   Recently   Has chest pain, states it lasts for a few seconds and goes away, still smoking.     Health Maintenance         Date Due Completion Date    Hepatitis C Screening Never done ---    COVID-19 Vaccine (1) Never done ---    Pneumococcal Vaccines (Age 0-64) (1 of 2 - PCV) Never done ---    HIV Screening Never done ---    TETANUS VACCINE Never done ---    High Dose Statin Never done ---    Shingles Vaccine (3 of 3) 10/17/2022 8/22/2022    LDCT Lung Screen 04/03/2024 4/3/2023    Colorectal Cancer Screening 05/17/2028 5/17/2021    Lipid Panel 03/18/2029 3/18/2024             Past Medical History:   Diagnosis Date    Depression     Hypertriglyceridemia     Personal history of colonic polyps 05/17/2021    Colonoscopy Report    Tobacco user         Past Surgical History:   Procedure Laterality Date    COLONOSCOPY  05/17/2021    Leeroy Amaya MD    HERNIA REPAIR  1989        Social History     Tobacco Use    Smoking status: Every Day     Current packs/day: 1.00     Average packs/day: 1 pack/day for 30.0 years (30.0 ttl pk-yrs)     Types: Cigarettes    Smokeless tobacco: Never   Substance and Sexual Activity    Alcohol use:  "Yes     Alcohol/week: 24.0 standard drinks of alcohol     Types: 24 Cans of beer per week    Drug use: Never    Sexual activity: Yes     Partners: Female     Birth control/protection: None        Current Outpatient Medications   Medication Instructions    amlodipine-olmesartan (KEITH) 5-40 mg per tablet 1 tablet, Oral, Daily    citalopram (CELEXA) 40 MG tablet 1/2 tab a day for a week, then 1/2 tab every other day for a week, then stop    gemfibroziL (LOPID) 600 mg, Oral, 2 times daily before meals    sildenafiL (VIAGRA) 50 mg, Oral, As needed (PRN)    tamsulosin (FLOMAX) 0.4 mg, Oral, Daily    vilazodone (VIIBRYD) 10 mg (7)- 20 mg (23) DsPk As per pack instructions, call MD for next dose    vilazodone (VIIBRYD) 20 mg, Oral, Daily       Review of patient's allergies indicates:  No Known Allergies     Patient Care Team:  Elías Hou MD as PCP - General (Internal Medicine)  Elías Hou MD     Subjective:     Review of Systems    12 point review of systems conducted, negative except as stated in the history of present illness. See HPI for details.    Objective:     Visit Vitals  /82 (BP Location: Left arm, Patient Position: Sitting, BP Method: Medium (Manual))   Pulse 73   Temp 97.5 °F (36.4 °C) (Temporal)   Resp 16   Ht 5' 9" (1.753 m)   Wt 70.3 kg (155 lb)   SpO2 97%   BMI 22.89 kg/m²       Physical Exam  Constitutional:       Appearance: Normal appearance.   HENT:      Head: Normocephalic and atraumatic.   Eyes:      Extraocular Movements: Extraocular movements intact.      Pupils: Pupils are equal, round, and reactive to light.   Cardiovascular:      Rate and Rhythm: Normal rate and regular rhythm.   Pulmonary:      Effort: Pulmonary effort is normal.      Breath sounds: Normal breath sounds.   Abdominal:      General: Bowel sounds are normal.      Palpations: Abdomen is soft.   Musculoskeletal:         General: Normal range of motion.   Skin:     General: Skin is warm and dry. "   Neurological:      General: No focal deficit present.      Mental Status: He is alert.   Psychiatric:         Mood and Affect: Mood normal.         Labs Reviewed:     Chemistry:  Lab Results   Component Value Date     03/18/2024    K 4.8 03/18/2024    CHLORIDE 105 03/18/2024    BUN 19.0 03/18/2024    CREATININE 0.91 03/18/2024    EGFRNORACEVR >60 03/18/2024    GLUCOSE 101 (H) 03/18/2024    CALCIUM 9.4 03/18/2024    ALKPHOS 143 03/18/2024    LABPROT 7.4 03/18/2024    ALBUMIN 4.1 03/18/2024    BILIDIR 0.1 08/19/2022    IBILI 0.10 08/19/2022    AST 29 03/18/2024    ALT 11 03/18/2024    GWFYRUSG49OH 32.4 03/18/2024    TSH 2.200 03/18/2024    PSA 0.60 03/18/2024        Lab Results   Component Value Date    HGBA1C 5.1 03/18/2024        Hematology:  Lab Results   Component Value Date    WBC 7.97 03/18/2024    HGB 14.0 03/18/2024    HCT 42.7 03/18/2024     03/18/2024       Lipid Panel:  Lab Results   Component Value Date    CHOL 174 03/18/2024    HDL 33 (L) 03/18/2024    LDL 77.00 03/18/2024    TRIG 318 (H) 03/18/2024    TOTALCHOLEST 5 03/18/2024        Urine:  Lab Results   Component Value Date    COLORUA Light-Yellow 03/18/2024    APPEARANCEUA Clear 03/18/2024    SGUA 1.017 03/18/2024    PHUA 6.5 03/18/2024    PROTEINUA Negative 03/18/2024    GLUCOSEUA Normal 03/18/2024    KETONESUA Negative 03/18/2024    BLOODUA Negative 03/18/2024    NITRITESUA Negative 03/18/2024    LEUKOCYTESUR Negative 03/18/2024    RBCUA 0-5 03/18/2024    WBCUA 0-5 03/18/2024    BACTERIA None Seen 03/18/2024    SQEPUA None Seen 03/18/2024        Assessment:       ICD-10-CM ICD-9-CM   1. Annual physical exam  Z00.00 V70.0   2. Elevated LDL cholesterol level  E78.00 272.0   3. Anxiety  F41.9 300.00   4. Hypertension, unspecified type  I10 401.9   5. Stable angina pectoris  I20.89 413.9   6. Tobacco abuse  Z72.0 305.1   7. Current mild episode of major depressive disorder without prior episode  F32.0 296.21        Plan:     1. Annual  physical exam  Assessment & Plan:    General health maintenance education given, labs reviewed and stable age-appropriate exams are up-to-date with the exception of low-dose screening CT of the chest which we will order      2. Elevated LDL cholesterol level  -     gemfibroziL (LOPID) 600 MG tablet; Take 1 tablet (600 mg total) by mouth 2 (two) times daily before meals.  Dispense: 180 tablet; Refill: 3    3. Anxiety  -     citalopram (CELEXA) 40 MG tablet; 1/2 tab a day for a week, then 1/2 tab every other day for a week, then stop  Dispense: 90 tablet; Refill: 3    4. Hypertension, unspecified type  -     amlodipine-olmesartan (KEITH) 5-40 mg per tablet; Take 1 tablet by mouth once daily.  Dispense: 90 tablet; Refill: 3  -     Ambulatory referral/consult to Cardiology; Future; Expected date: 03/25/2024    5. Stable angina pectoris  Assessment & Plan:  Reports occasional chest pain no associated shortness a breath or diaphoresis or nausea had cardiovascular workup within the past 5 years which was normal but has a lot of cardiovascular risk factors, still actively smoking.  Update low-dose CT of the chest.      Orders:  -     Ambulatory referral/consult to Cardiology; Future; Expected date: 03/25/2024    6. Tobacco abuse  -     CT Chest Lung Screening Low Dose; Future; Expected date: 03/18/2024    7. Current mild episode of major depressive disorder without prior episode  Assessment & Plan:  Very unmotivated, recently .  Wean Celexa start Viibryd          Other orders  -     tamsulosin (FLOMAX) 0.4 mg Cap; Take 1 capsule (0.4 mg total) by mouth once daily.  Dispense: 90 capsule; Refill: 3  -     vilazodone (VIIBRYD) 10 mg (7)- 20 mg (23) DsPk; As per pack instructions, call MD for next dose  Dispense: 30 tablet; Refill: 0  -     vilazodone (VIIBRYD) 20 mg Tab; Take 1 tablet (20 mg total) by mouth Daily.  Dispense: 30 tablet; Refill: 5         Follow up in about 6 weeks (around 4/29/2024) for depression. In  addition to their scheduled follow up, the patient has also been instructed to follow up on as needed basis.     No future appointments.     Elías Hou MD

## 2024-03-19 LAB
OHS QRS DURATION: 90 MS
OHS QTC CALCULATION: 398 MS

## 2024-04-08 ENCOUNTER — HOSPITAL ENCOUNTER (OUTPATIENT)
Dept: RADIOLOGY | Facility: HOSPITAL | Age: 59
Discharge: HOME | End: 2024-04-08
Attending: INTERNAL MEDICINE
Payer: COMMERCIAL

## 2024-04-08 DIAGNOSIS — Z12.2 SCREENING FOR MALIGNANT NEOPLASM OF RESPIRATORY ORGAN: ICD-10-CM

## 2024-04-08 PROCEDURE — 71271 CT THORAX LUNG CANCER SCR C-: CPT | Mod: TC

## 2024-04-08 NOTE — PROGRESS NOTES
Low-dose CT of the chest reviewed with stable 3 mm nodule in the right upper lobe no suspicious nodule seen, recall CT of the chest in 12 months.  Smoking cessation advised

## 2024-05-15 ENCOUNTER — TELEPHONE (OUTPATIENT)
Dept: INTERNAL MEDICINE | Facility: CLINIC | Age: 59
End: 2024-05-15
Payer: COMMERCIAL

## 2024-05-15 DIAGNOSIS — F41.9 ANXIETY: ICD-10-CM

## 2024-05-15 NOTE — TELEPHONE ENCOUNTER
----- Message from Abby Boone sent at 5/15/2024 12:20 PM CDT -----  Laina Spencer0 MARCIA Sandoval    Citalopram (CELEXA) 40MG tab    QTY: 120

## 2024-05-15 NOTE — TELEPHONE ENCOUNTER
Prescription was discontinued at appt on 3/18/24 and pt was to f/u in 6 weeks about change in medication. Pt did not have f/u. Please schedule pt a f/u for medication change.

## 2024-05-30 NOTE — PROGRESS NOTES
Internal Medicine    Patient ID: [id number]     Chief Complaint: Follow-up      HPI:     Satya Hilliard is a 58 y.o. male here today for a follow up. Switched from Celexa to Viibryd at last visit in 3/2024.  Tolerating well without adverse effects. Feels this is adequately controlling depression. Has yet to complete stress test - will call cardiology today to make arrangements. Still smoking - not yet ready to quit. No other complaints today.     Past Medical History:   Diagnosis Date    Depression     Hypertriglyceridemia     Personal history of colonic polyps 05/17/2021    Colonoscopy Report    Tobacco user         Past Surgical History:   Procedure Laterality Date    COLONOSCOPY  05/17/2021    Leeroy Amaya MD    HERNIA REPAIR  1989        Social History     Tobacco Use    Smoking status: Every Day     Current packs/day: 1.00     Average packs/day: 1 pack/day for 30.0 years (30.0 ttl pk-yrs)     Types: Cigarettes    Smokeless tobacco: Never   Substance and Sexual Activity    Alcohol use: Yes     Alcohol/week: 24.0 standard drinks of alcohol     Types: 24 Cans of beer per week    Drug use: Never    Sexual activity: Yes     Partners: Female     Birth control/protection: None        Current Outpatient Medications   Medication Instructions    amlodipine-olmesartan (KEITH) 5-40 mg per tablet 1 tablet, Oral, Daily    aspirin 81 mg, Oral, Daily    gemfibroziL (LOPID) 600 mg, Oral, 2 times daily before meals    sildenafiL (VIAGRA) 50 mg, Oral, As needed (PRN)    tamsulosin (FLOMAX) 0.4 mg, Oral, Daily    vilazodone (VIIBRYD) 20 mg, Oral, Daily       Review of patient's allergies indicates:  No Known Allergies     Patient Care Team:  Elías Hou MD as PCP - General (Internal Medicine)  Elías Hou MD     Subjective:     Review of Systems    12 point review of systems conducted, negative except as stated in the history of present illness. See HPI for details.    Objective:     Visit  "Vitals  /78   Pulse 66   Ht 5' 9" (1.753 m)   Wt 73 kg (161 lb)   SpO2 98%   BMI 23.78 kg/m²       Physical Exam  Constitutional:       Appearance: Normal appearance.   HENT:      Head: Normocephalic and atraumatic.   Eyes:      Extraocular Movements: Extraocular movements intact.      Pupils: Pupils are equal, round, and reactive to light.   Cardiovascular:      Rate and Rhythm: Normal rate and regular rhythm.   Pulmonary:      Effort: Pulmonary effort is normal.      Breath sounds: Normal breath sounds.   Abdominal:      General: Bowel sounds are normal.      Palpations: Abdomen is soft.   Musculoskeletal:         General: Normal range of motion.   Skin:     General: Skin is warm and dry.   Neurological:      General: No focal deficit present.      Mental Status: He is alert.   Psychiatric:         Mood and Affect: Mood normal.         Labs Reviewed:     Chemistry:  Lab Results   Component Value Date     03/18/2024    K 4.8 03/18/2024    CHLORIDE 105 03/18/2024    BUN 19.0 03/18/2024    CREATININE 0.91 03/18/2024    EGFRNORACEVR >60 03/18/2024    GLUCOSE 101 (H) 03/18/2024    CALCIUM 9.4 03/18/2024    ALKPHOS 143 03/18/2024    LABPROT 7.4 03/18/2024    ALBUMIN 4.1 03/18/2024    BILIDIR 0.1 08/19/2022    IBILI 0.10 08/19/2022    AST 29 03/18/2024    ALT 11 03/18/2024    SIUNOPRN30AU 32.4 03/18/2024    TSH 2.200 03/18/2024    PSA 0.60 03/18/2024        Lab Results   Component Value Date    HGBA1C 5.1 03/18/2024        Hematology:  Lab Results   Component Value Date    WBC 7.97 03/18/2024    HGB 14.0 03/18/2024    HCT 42.7 03/18/2024     03/18/2024       Lipid Panel:  Lab Results   Component Value Date    CHOL 174 03/18/2024    HDL 33 (L) 03/18/2024    LDL 77.00 03/18/2024    TRIG 318 (H) 03/18/2024    TOTALCHOLEST 5 03/18/2024        Urine:  Lab Results   Component Value Date    COLORUA Light-Yellow 03/18/2024    APPEARANCEUA Clear 03/18/2024    SGUA 1.017 03/18/2024    PHUA 6.5 03/18/2024    " PROTEINUA Negative 03/18/2024    GLUCOSEUA Normal 03/18/2024    KETONESUA Negative 03/18/2024    BLOODUA Negative 03/18/2024    NITRITESUA Negative 03/18/2024    LEUKOCYTESUR Negative 03/18/2024    RBCUA 0-5 03/18/2024    WBCUA 0-5 03/18/2024    BACTERIA None Seen 03/18/2024    SQEPUA None Seen 03/18/2024        Assessment:       ICD-10-CM ICD-9-CM   1. Current mild episode of major depressive disorder without prior episode  F32.0 296.21        Plan:     1. Current mild episode of major depressive disorder without prior episode  Assessment & Plan:  Continue Viibryd 20mg daily  Educated patient on the risks of serotonin based medications such as serotonin modulators and SSRIs/SNRIs including common side effects of nausea, GI upset, headache dizziness as well as the rare risk for worsening symptoms of depression including development of suicidal thoughts or ideations, and serotonin syndrome.   Exercise daily. Get sunlight daily.  Practice positive phrases and repeat throughout the day, along with yoga and relaxation techniques.  Establish good social support, make changes to reduce stress.  Reports any symptoms of suicidal/homicidal ideations or self harm immediately, if clinic is closed go to nearest emergency room.               Follow up in about 6 months (around 11/30/2024) for Routine Follow Up. In addition to their scheduled follow up, the patient has also been instructed to follow up on as needed basis.     Future Appointments   Date Time Provider Department Center   12/13/2024  8:00 AM Keenan Regan FNP [facility] [id number]        CARMEN Isaacs

## 2024-05-31 ENCOUNTER — OFFICE VISIT (OUTPATIENT)
Dept: INTERNAL MEDICINE | Facility: CLINIC | Age: 59
End: 2024-05-31
Payer: COMMERCIAL

## 2024-05-31 VITALS
HEIGHT: 69 IN | BODY MASS INDEX: 23.85 KG/M2 | SYSTOLIC BLOOD PRESSURE: 120 MMHG | WEIGHT: 161 LBS | DIASTOLIC BLOOD PRESSURE: 78 MMHG | HEART RATE: 66 BPM | OXYGEN SATURATION: 98 %

## 2024-05-31 DIAGNOSIS — F32.0 CURRENT MILD EPISODE OF MAJOR DEPRESSIVE DISORDER WITHOUT PRIOR EPISODE: Primary | ICD-10-CM

## 2024-05-31 PROCEDURE — 3008F BODY MASS INDEX DOCD: CPT | Mod: CPTII,,, | Performed by: NURSE PRACTITIONER

## 2024-05-31 PROCEDURE — 3078F DIAST BP <80 MM HG: CPT | Mod: CPTII,,, | Performed by: NURSE PRACTITIONER

## 2024-05-31 PROCEDURE — 1159F MED LIST DOCD IN RCRD: CPT | Mod: CPTII,,, | Performed by: NURSE PRACTITIONER

## 2024-05-31 PROCEDURE — 3074F SYST BP LT 130 MM HG: CPT | Mod: CPTII,,, | Performed by: NURSE PRACTITIONER

## 2024-05-31 PROCEDURE — 99213 OFFICE O/P EST LOW 20 MIN: CPT | Mod: ,,, | Performed by: NURSE PRACTITIONER

## 2024-05-31 PROCEDURE — 4010F ACE/ARB THERAPY RXD/TAKEN: CPT | Mod: CPTII,,, | Performed by: NURSE PRACTITIONER

## 2024-05-31 PROCEDURE — 3044F HG A1C LEVEL LT 7.0%: CPT | Mod: CPTII,,, | Performed by: NURSE PRACTITIONER

## 2024-05-31 RX ORDER — NAPROXEN SODIUM 220 MG/1
81 TABLET, FILM COATED ORAL DAILY
COMMUNITY

## 2024-05-31 NOTE — ASSESSMENT & PLAN NOTE
Continue Viibryd 20mg daily  Educated patient on the risks of serotonin based medications such as serotonin modulators and SSRIs/SNRIs including common side effects of nausea, GI upset, headache dizziness as well as the rare risk for worsening symptoms of depression including development of suicidal thoughts or ideations, and serotonin syndrome.   Exercise daily. Get sunlight daily.  Practice positive phrases and repeat throughout the day, along with yoga and relaxation techniques.  Establish good social support, make changes to reduce stress.  Reports any symptoms of suicidal/homicidal ideations or self harm immediately, if clinic is closed go to nearest emergency room.

## 2024-06-17 PROBLEM — Z00.00 ANNUAL PHYSICAL EXAM: Status: RESOLVED | Noted: 2024-03-18 | Resolved: 2024-06-17

## 2024-07-15 ENCOUNTER — TELEPHONE (OUTPATIENT)
Dept: INTERNAL MEDICINE | Facility: CLINIC | Age: 59
End: 2024-07-15
Payer: COMMERCIAL

## 2024-07-15 DIAGNOSIS — I10 HYPERTENSION, UNSPECIFIED TYPE: ICD-10-CM

## 2024-07-15 RX ORDER — AMLODIPINE AND OLMESARTAN MEDOXOMIL 5; 40 MG/1; MG/1
1 TABLET ORAL DAILY
Qty: 90 TABLET | Refills: 3 | Status: SHIPPED | OUTPATIENT
Start: 2024-07-15

## 2024-07-15 NOTE — TELEPHONE ENCOUNTER
----- Message from Abby Boone sent at 7/15/2024 10:36 AM CDT -----  [facility]  [address]    AMLODIPINE/OLMES MEDOXOM 5-40MG T    QTY: 90

## 2024-12-02 ENCOUNTER — TELEPHONE (OUTPATIENT)
Dept: INTERNAL MEDICINE | Facility: CLINIC | Age: 59
End: 2024-12-02
Payer: COMMERCIAL

## 2024-12-02 DIAGNOSIS — F32.0 CURRENT MILD EPISODE OF MAJOR DEPRESSIVE DISORDER WITHOUT PRIOR EPISODE: Primary | ICD-10-CM

## 2024-12-02 RX ORDER — VILAZODONE HYDROCHLORIDE 20 MG/1
20 TABLET ORAL DAILY
Qty: 30 TABLET | Refills: 5 | Status: SHIPPED | OUTPATIENT
Start: 2024-12-02 | End: 2024-12-04 | Stop reason: SDUPTHER

## 2024-12-02 NOTE — TELEPHONE ENCOUNTER
----- Message from Christina sent at 12/2/2024  8:31 AM CST -----  Regarding: Medication Refill  Contact: [facility]  vilazodone (VIIBRYD) 20 mg Tab  Qty 30  Directions: Take 1 Tablet (20 MG) by mouth daily  Pharm[facility]: [facility]  [address] [place] [address]  [phone]

## 2024-12-04 ENCOUNTER — TELEPHONE (OUTPATIENT)
Dept: INTERNAL MEDICINE | Facility: CLINIC | Age: 59
End: 2024-12-04
Payer: COMMERCIAL

## 2024-12-04 DIAGNOSIS — F32.0 CURRENT MILD EPISODE OF MAJOR DEPRESSIVE DISORDER WITHOUT PRIOR EPISODE: ICD-10-CM

## 2024-12-04 RX ORDER — VILAZODONE HYDROCHLORIDE 20 MG/1
20 TABLET ORAL DAILY
Qty: 30 TABLET | Refills: 5 | Status: SHIPPED | OUTPATIENT
Start: 2024-12-04 | End: 2025-12-04

## 2024-12-04 NOTE — TELEPHONE ENCOUNTER
----- Message from Amalia sent at 12/4/2024 12:08 PM CST -----  .Type:  RX Refill Request    Who Called: Richelle with [facility]  Refill or New Rx:refill   RX Name and Strength:vilazodone (VIIBRYD) 20 mg Tab  How is the patient currently taking it? (ex. 1XDay):1/day  Is this a 30 day or 90 day RX:30  Preferred Pharmacy with phone number:[facility] in [place]  Local or Mail Order:Local  Ordering Provider:Vanda  Would the patient rather a call back or a response via [facility]?   Best Call Back Number:[phone]  Additional Information: vilazodone (VIIBRYD) 20 mg Tab     Please send to [facility] in [place]

## 2024-12-12 NOTE — ASSESSMENT & PLAN NOTE
Lab Results   Component Value Date    LDL 77.00 03/18/2024    TRIG 318 (H) 03/18/2024    HDL 33 (L) 03/18/2024    TOTALCHOLEST 5 03/18/2024     Hyperlipidemia Medications               gemfibroziL (LOPID) 600 MG tablet Take 1 tablet (600 mg total) by mouth 2 (two) times daily before meals.   Labwork today  Stressed importance of dietary modifications. Follow a low cholesterol, low saturated fat diet with less that 200mg of cholesterol a day.  Avoid fried foods and high saturated fats (high saturated fats less than 7% of calories).  Add Flax Seed/Fish Oil supplements to diet. Increase dietary fiber.  Regular exercise can reduce LDL and raise HDL. Stressed importance of physical activity 5 times per week for 30 minutes per day.

## 2024-12-12 NOTE — PROGRESS NOTES
Internal Medicine    Patient ID: [id number]     Chief Complaint: Hypertension (6 month)      HPI:     Satya Hilliard is a 59 y.o. male here today for a follow up. Needs labwork completed as he did not complete prior to visit. Hypertensive today - has been out of medications for over a month. Admits to depression over the past few months, especially around the holidays. Denies SI/HI or thoughts of self harm. Does not have a good support system. Discussed addition of counseling and he will look into this. Has been off of Viibryd - will resume today. Amenable to flu vaccine today. No other complaints today.     Past Medical History:   Diagnosis Date    Depression     Hypertriglyceridemia     Personal history of colonic polyps 05/17/2021    Colonoscopy Report    Tobacco user         Past Surgical History:   Procedure Laterality Date    COLONOSCOPY  05/17/2021    Leeroy Amaya MD    HERNIA REPAIR  1989        Social History     Tobacco Use    Smoking status: Every Day     Current packs/day: 1.00     Average packs/day: 1 pack/day for 30.0 years (30.0 ttl pk-yrs)     Types: Cigarettes    Smokeless tobacco: Never   Substance and Sexual Activity    Alcohol use: Yes     Alcohol/week: 24.0 standard drinks of alcohol     Types: 24 Cans of beer per week    Drug use: Never    Sexual activity: Yes     Partners: Female     Birth control/protection: None        Current Outpatient Medications   Medication Instructions    amlodipine-olmesartan (KEITH) 5-40 mg per tablet 1 tablet, Oral, Daily    aspirin 81 mg, Daily    gemfibroziL (LOPID) 600 mg, Oral, 2 times daily before meals    sildenafiL (VIAGRA) 50 mg, As needed (PRN)    tamsulosin (FLOMAX) 0.4 mg, Oral, Daily    vilazodone (VIIBRYD) 20 mg, Oral, Daily       Review of patient's allergies indicates:  No Known Allergies     Patient Care Team:  Elías Hou MD as PCP - General (Internal Medicine)  Elías Hou MD     Subjective:     Review of  "Systems    12 point review of systems conducted, negative except as stated in the history of present illness. See HPI for details.    Objective:     Visit Vitals  BP (!) (P) 146/84 (BP Location: Left arm, Patient Position: Sitting)   Pulse 65   Temp 97.8 °F (36.6 °C) (Temporal)   Resp 16   Ht 5' 9" (1.753 m)   Wt 68 kg (150 lb)   SpO2 96%   BMI 22.15 kg/m²       Physical Exam  Constitutional:       Appearance: Normal appearance.   HENT:      Head: Normocephalic and atraumatic.   Eyes:      Extraocular Movements: Extraocular movements intact.      Pupils: Pupils are equal, round, and reactive to light.   Cardiovascular:      Rate and Rhythm: Normal rate and regular rhythm.   Pulmonary:      Effort: Pulmonary effort is normal.      Breath sounds: Normal breath sounds.   Abdominal:      General: Bowel sounds are normal.      Palpations: Abdomen is soft.   Musculoskeletal:         General: Normal range of motion.   Skin:     General: Skin is warm and dry.   Neurological:      General: No focal deficit present.      Mental Status: He is alert.   Psychiatric:         Mood and Affect: Mood normal. Affect is tearful.         Labs Reviewed:     Chemistry:  Lab Results   Component Value Date     03/18/2024    K 4.8 03/18/2024    BUN 19.0 03/18/2024    CREATININE 0.91 03/18/2024    EGFRNORACEVR >60 03/18/2024    GLUCOSE 101 (H) 03/18/2024    CALCIUM 9.4 03/18/2024    ALKPHOS 143 03/18/2024    LABPROT 7.4 03/18/2024    ALBUMIN 4.1 03/18/2024    BILIDIR 0.1 08/19/2022    IBILI 0.10 08/19/2022    AST 29 03/18/2024    ALT 11 03/18/2024    [id number] 32.4 03/18/2024    TSH 2.200 03/18/2024    PSA 0.60 03/18/2024        Lab Results   Component Value Date    HGBA1C 5.1 03/18/2024        Hematology:  Lab Results   Component Value Date    WBC 7.97 03/18/2024    HGB 14.0 03/18/2024    HCT 42.7 03/18/2024     03/18/2024       Lipid Panel:  Lab Results   Component Value Date    CHOL 174 03/18/2024    HDL 33 (L) 03/18/2024 "    LDL 77.00 03/18/2024    TRIG 318 (H) 03/18/2024    TOTALCHOLEST 5 03/18/2024        Urine:  Lab Results   Component Value Date    APPEARANCEUA Clear 03/18/2024    SGUA 1.017 03/18/2024    PROTEINUA Negative 03/18/2024    KETONESUA Negative 03/18/2024    LEUKOCYTESUR Negative 03/18/2024    RBCUA 0-5 03/18/2024    WBCUA 0-5 03/18/2024    BACTERIA None Seen 03/18/2024    SQEPUA None Seen 03/18/2024        Assessment:       ICD-10-CM ICD-9-CM   1. Primary hypertension  I10 401.9   2. Mild episode of recurrent major depressive disorder  F33.0 296.31   3. Elevated LDL cholesterol level  E78.00 272.0   4. Hypertension, unspecified type  I10 401.9   5. Need for vaccination  Z23 V05.9   6. Anxiety  F41.9 300.00        Plan:     1. Primary hypertension  Assessment & Plan:  Poorly controlled.   Hypertension Medications               amlodipine-olmesartan (KEITH) 5-40 mg per tablet Take 1 tablet by mouth once daily.   Needs to resume antihypertensive and follow up for BP check in 2 weeks  Low Sodium Diet (DASH Diet - Less than 2 grams of sodium per day).  Monitor blood pressure daily and log. Report consistent numbers greater than 140/90.  Maintain healthy weight with goal BMI <30. Exercise 30 minutes per day, 5 days per week.    Orders:  -     Cancel: Lipid Panel; Future; Expected date: 12/13/2024  -     Cancel: Comprehensive Metabolic Panel; Future; Expected date: 12/13/2024  -     Comprehensive Metabolic Panel; Future; Expected date: 12/13/2024  -     Lipid Panel; Future; Expected date: 12/13/2024    2. Mild episode of recurrent major depressive disorder  Assessment & Plan:  Resume Viibryd 20mg daily  Recommend CBT - he will look into counseling.  Educated patient on the risks of serotonin based medications such as serotonin modulators and SSRIs/SNRIs including common side effects of nausea, GI upset, headache dizziness as well as the rare risk for worsening symptoms of depression including development of suicidal thoughts  or ideations, and serotonin syndrome.   Exercise daily. Get sunlight daily.  Practice positive phrases and repeat throughout the day, along with yoga and relaxation techniques.  Establish good social support, make changes to reduce stress.  Reports any symptoms of suicidal/homicidal ideations or self harm immediately, if clinic is closed go to nearest emergency room.        Orders:  -     vilazodone (VIIBRYD) 20 mg Tab; Take 1 tablet (20 mg total) by mouth Daily.  Dispense: 90 tablet; Refill: 1    3. Elevated LDL cholesterol level  Assessment & Plan:  Lab Results   Component Value Date    LDL 77.00 03/18/2024    TRIG 318 (H) 03/18/2024    HDL 33 (L) 03/18/2024    TOTALCHOLEST 5 03/18/2024     Hyperlipidemia Medications               gemfibroziL (LOPID) 600 MG tablet Take 1 tablet (600 mg total) by mouth 2 (two) times daily before meals.   Labwork today  Stressed importance of dietary modifications. Follow a low cholesterol, low saturated fat diet with less that 200mg of cholesterol a day.  Avoid fried foods and high saturated fats (high saturated fats less than 7% of calories).  Add Flax Seed/Fish Oil supplements to diet. Increase dietary fiber.  Regular exercise can reduce LDL and raise HDL. Stressed importance of physical activity 5 times per week for 30 minutes per day.       Orders:  -     Cancel: Lipid Panel; Future; Expected date: 12/13/2024  -     Cancel: Comprehensive Metabolic Panel; Future; Expected date: 12/13/2024  -     Comprehensive Metabolic Panel; Future; Expected date: 12/13/2024  -     Lipid Panel; Future; Expected date: 12/13/2024    4. Hypertension, unspecified type  Assessment & Plan:  Poorly controlled.   Hypertension Medications               amlodipine-olmesartan (KEITH) 5-40 mg per tablet Take 1 tablet by mouth once daily.   Needs to resume antihypertensive and follow up for BP check in 2 weeks  Low Sodium Diet (DASH Diet - Less than 2 grams of sodium per day).  Monitor blood pressure daily  and log. Report consistent numbers greater than 140/90.  Maintain healthy weight with goal BMI <30. Exercise 30 minutes per day, 5 days per week.    Orders:  -     amlodipine-olmesartan (KEITH) 5-40 mg per tablet; Take 1 tablet by mouth once daily.  Dispense: 90 tablet; Refill: 1    5. Need for vaccination  -     Influenza - Quadrivalent (PF)  -     (VFC) influenza (Flulaval, Fluzone, Fluarix) 45 mcg/0.5 mL IM vaccine (> or = 6 mo) 0.5 mL    6. Anxiety         Follow up in about 2 weeks (around 12/27/2024) for Nurse Visit Blood Pressure Check, 4 month wellness. In addition to their scheduled follow up, the patient has also been instructed to follow up on as needed basis.     Future Appointments   Date Time Provider Department Center   12/27/2024  8:00 AM NURSE, [facility] [address] [facility] MEDICINE [facility] [id number]   4/4/2025  8:20 AM Keenan Regan FNP [id number]          CARMEN Isaacs

## 2024-12-12 NOTE — ASSESSMENT & PLAN NOTE
Resume Viibryd 20mg daily  Recommend CBT - he will look into counseling.  Educated patient on the risks of serotonin based medications such as serotonin modulators and SSRIs/SNRIs including common side effects of nausea, GI upset, headache dizziness as well as the rare risk for worsening symptoms of depression including development of suicidal thoughts or ideations, and serotonin syndrome.   Exercise daily. Get sunlight daily.  Practice positive phrases and repeat throughout the day, along with yoga and relaxation techniques.  Establish good social support, make changes to reduce stress.  Reports any symptoms of suicidal/homicidal ideations or self harm immediately, if clinic is closed go to nearest emergency room.

## 2024-12-13 ENCOUNTER — OFFICE VISIT (OUTPATIENT)
Dept: INTERNAL MEDICINE | Facility: CLINIC | Age: 59
End: 2024-12-13
Payer: COMMERCIAL

## 2024-12-13 VITALS
RESPIRATION RATE: 16 BRPM | WEIGHT: 150 LBS | OXYGEN SATURATION: 96 % | HEIGHT: 69 IN | TEMPERATURE: 98 F | BODY MASS INDEX: 22.22 KG/M2 | HEART RATE: 65 BPM

## 2024-12-13 DIAGNOSIS — Z23 NEED FOR VACCINATION: ICD-10-CM

## 2024-12-13 DIAGNOSIS — F41.9 ANXIETY: ICD-10-CM

## 2024-12-13 DIAGNOSIS — I10 HYPERTENSION, UNSPECIFIED TYPE: ICD-10-CM

## 2024-12-13 DIAGNOSIS — E78.00 ELEVATED LDL CHOLESTEROL LEVEL: ICD-10-CM

## 2024-12-13 DIAGNOSIS — F33.0 MILD EPISODE OF RECURRENT MAJOR DEPRESSIVE DISORDER: ICD-10-CM

## 2024-12-13 DIAGNOSIS — I10 PRIMARY HYPERTENSION: Primary | ICD-10-CM

## 2024-12-13 PROBLEM — F32.1 MAJOR DEPRESSIVE DISORDER, SINGLE EPISODE, MODERATE: Status: RESOLVED | Noted: 2024-12-13 | Resolved: 2024-12-13

## 2024-12-13 PROBLEM — F32.1 MAJOR DEPRESSIVE DISORDER, SINGLE EPISODE, MODERATE: Status: ACTIVE | Noted: 2024-12-13

## 2024-12-13 LAB
ALBUMIN SERPL-MCNC: 3.8 G/DL (ref 3.5–5)
ALBUMIN/GLOB SERPL: 1.2 RATIO (ref 1.1–2)
ALP SERPL-CCNC: 157 UNIT/L (ref 40–150)
ALT SERPL-CCNC: 7 UNIT/L (ref 0–55)
ANION GAP SERPL CALC-SCNC: 6 MEQ/L
AST SERPL-CCNC: 19 UNIT/L (ref 5–34)
BILIRUB SERPL-MCNC: 0.2 MG/DL
BUN SERPL-MCNC: 13.4 MG/DL (ref 8.4–25.7)
CALCIUM SERPL-MCNC: 9.2 MG/DL (ref 8.4–10.2)
CHLORIDE SERPL-SCNC: 109 MMOL/L (ref 98–107)
CHOLEST SERPL-MCNC: 139 MG/DL
CHOLEST/HDLC SERPL: 3 {RATIO} (ref 0–5)
CO2 SERPL-SCNC: 24 MMOL/L (ref 22–29)
CREAT SERPL-MCNC: 0.94 MG/DL (ref 0.72–1.25)
CREAT/UREA NIT SERPL: 14
GFR SERPLBLD CREATININE-BSD FMLA CKD-EPI: >60 ML/MIN/1.73/M2
GLOBULIN SER-MCNC: 3.2 GM/DL (ref 2.4–3.5)
GLUCOSE SERPL-MCNC: 164 MG/DL (ref 74–100)
HDLC SERPL-MCNC: 44 MG/DL (ref 35–60)
LDLC SERPL CALC-MCNC: 83 MG/DL (ref 50–140)
POTASSIUM SERPL-SCNC: 4.1 MMOL/L (ref 3.5–5.1)
PROT SERPL-MCNC: 7 GM/DL (ref 6.4–8.3)
SODIUM SERPL-SCNC: 139 MMOL/L (ref 136–145)
TRIGL SERPL-MCNC: 58 MG/DL (ref 34–140)
VLDLC SERPL CALC-MCNC: 12 MG/DL

## 2024-12-13 PROCEDURE — 80053 COMPREHEN METABOLIC PANEL: CPT | Performed by: NURSE PRACTITIONER

## 2024-12-13 PROCEDURE — 36415 COLL VENOUS BLD VENIPUNCTURE: CPT | Performed by: NURSE PRACTITIONER

## 2024-12-13 PROCEDURE — 80061 LIPID PANEL: CPT | Performed by: NURSE PRACTITIONER

## 2024-12-13 RX ORDER — AMLODIPINE AND OLMESARTAN MEDOXOMIL 5; 40 MG/1; MG/1
1 TABLET ORAL DAILY
Qty: 90 TABLET | Refills: 1 | Status: SHIPPED | OUTPATIENT
Start: 2024-12-13

## 2024-12-13 RX ORDER — VILAZODONE HYDROCHLORIDE 20 MG/1
20 TABLET ORAL DAILY
Qty: 90 TABLET | Refills: 1 | Status: SHIPPED | OUTPATIENT
Start: 2024-12-13 | End: 2025-12-13

## 2024-12-18 DIAGNOSIS — R73.9 HYPERGLYCEMIA: Primary | ICD-10-CM

## 2024-12-19 DIAGNOSIS — F33.0 MILD EPISODE OF RECURRENT MAJOR DEPRESSIVE DISORDER: ICD-10-CM

## 2024-12-19 RX ORDER — VILAZODONE HYDROCHLORIDE 20 MG/1
20 TABLET ORAL DAILY
Qty: 90 TABLET | Refills: 1 | Status: SHIPPED | OUTPATIENT
Start: 2024-12-19 | End: 2025-12-19

## 2024-12-19 RX ORDER — VILAZODONE HYDROCHLORIDE 20 MG/1
20 TABLET ORAL DAILY
Qty: 90 TABLET | Refills: 1 | OUTPATIENT
Start: 2024-12-19 | End: 2025-12-19

## 2024-12-19 NOTE — TELEPHONE ENCOUNTER
----- Message from Rj sent at 12/19/2024  4:12 PM CST -----  .Type:  Needs Medical Advice    Who Called: Satya  Symptoms (please be specific):    How long has patient had these symptoms:    Pharmacy name and phone #:    Would the patient rather a call back or a response via [facility]?   Best Call Back Number: [phone]  Additional Information: Patient requested a call back from nurse he needed to speak with them about his medication. Please call him when available. Thanks.

## 2024-12-19 NOTE — TELEPHONE ENCOUNTER
Spoke to pt who stated that [facility] could not fill Viibryd RX due to another pharmacy having the prescription. I saw that the only other place with RX was [facility], and pt stated that he will not be able to get it due to no longer being a member at [employer]. I called [facility] to cancel order, and re-sent RX to [facility]. Pt is aware.

## 2024-12-27 ENCOUNTER — LAB VISIT (OUTPATIENT)
Dept: LAB | Facility: HOSPITAL | Age: 59
End: 2024-12-27
Attending: NURSE PRACTITIONER
Payer: COMMERCIAL

## 2024-12-27 ENCOUNTER — TELEPHONE (OUTPATIENT)
Dept: INTERNAL MEDICINE | Facility: CLINIC | Age: 59
End: 2024-12-27

## 2024-12-27 ENCOUNTER — PATIENT MESSAGE (OUTPATIENT)
Dept: INTERNAL MEDICINE | Facility: CLINIC | Age: 59
End: 2024-12-27

## 2024-12-27 ENCOUNTER — CLINICAL SUPPORT (OUTPATIENT)
Dept: INTERNAL MEDICINE | Facility: CLINIC | Age: 59
End: 2024-12-27
Payer: COMMERCIAL

## 2024-12-27 VITALS — HEART RATE: 78 BPM | DIASTOLIC BLOOD PRESSURE: 64 MMHG | SYSTOLIC BLOOD PRESSURE: 116 MMHG

## 2024-12-27 DIAGNOSIS — I10 PRIMARY HYPERTENSION: Primary | ICD-10-CM

## 2024-12-27 DIAGNOSIS — R73.9 HYPERGLYCEMIA: ICD-10-CM

## 2024-12-27 LAB
EST. AVERAGE GLUCOSE BLD GHB EST-MCNC: 93.9 MG/DL
HBA1C MFR BLD: 4.9 %

## 2024-12-27 PROCEDURE — 83036 HEMOGLOBIN GLYCOSYLATED A1C: CPT | Performed by: NURSE PRACTITIONER

## 2024-12-27 PROCEDURE — 36415 COLL VENOUS BLD VENIPUNCTURE: CPT

## 2024-12-27 NOTE — PROGRESS NOTES
Pt came into office today for BP Check. BP readings are marked in vitals.  BP was check on left arm with manual cuff.

## 2024-12-27 NOTE — TELEPHONE ENCOUNTER
----- Message from Elías Hou MD sent at 12/27/2024 11:08 AM CST -----  Blood pressure excellent, no new recommendations  ----- Message -----  From: Roberth Rollins MA  Sent: 12/27/2024   8:02 AM CST  To: CARMEN Tubbs    2 week BP check. Pt does not have home cuff

## 2025-01-30 ENCOUNTER — TELEPHONE (OUTPATIENT)
Dept: INTERNAL MEDICINE | Facility: CLINIC | Age: 60
End: 2025-01-30
Payer: COMMERCIAL

## 2025-01-30 DIAGNOSIS — R33.9 URINARY RETENTION: Primary | ICD-10-CM

## 2025-01-30 RX ORDER — TAMSULOSIN HYDROCHLORIDE 0.4 MG/1
1 CAPSULE ORAL DAILY
Qty: 90 CAPSULE | Refills: 3 | Status: SHIPPED | OUTPATIENT
Start: 2025-01-30

## 2025-01-30 NOTE — TELEPHONE ENCOUNTER
----- Message from Abby sent at 1/30/2025 11:25 AM CST -----  [facility]  [address]    tamsulosin (FLOMAX) 0.4 mg Cap     QTY: 90

## 2025-03-31 ENCOUNTER — TELEPHONE (OUTPATIENT)
Dept: INTERNAL MEDICINE | Facility: CLINIC | Age: 60
End: 2025-03-31
Payer: COMMERCIAL

## 2025-03-31 DIAGNOSIS — Z00.00 WELLNESS EXAMINATION: Primary | ICD-10-CM

## 2025-03-31 NOTE — TELEPHONE ENCOUNTER
----- Message from Med Assistant Chaidez sent at 3/31/2025  8:02 AM CDT -----  Regarding: Keenan 4/4/25  1. Are there any outstanding tasks in the patient's chart? Yes, Fasting Labs 2. Does patient have home blood pressure cuff?  [ ] Yes  /   [ ] No(If yes, please have patient bring to appointment for validation.)3. Remind patient to bring in a list of medications or bottles of all medications including: A. All Prescription MedicationsB. Over-the-Counter Supplements and/or VitaminsC. Drops (ear and/or eye)D. Topical Creams

## 2025-04-04 ENCOUNTER — LAB VISIT (OUTPATIENT)
Dept: LAB | Facility: HOSPITAL | Age: 60
End: 2025-04-04
Attending: INTERNAL MEDICINE
Payer: COMMERCIAL

## 2025-04-04 ENCOUNTER — OFFICE VISIT (OUTPATIENT)
Dept: INTERNAL MEDICINE | Facility: CLINIC | Age: 60
End: 2025-04-04
Payer: COMMERCIAL

## 2025-04-04 VITALS
HEIGHT: 69 IN | TEMPERATURE: 98 F | RESPIRATION RATE: 16 BRPM | SYSTOLIC BLOOD PRESSURE: 142 MMHG | OXYGEN SATURATION: 97 % | DIASTOLIC BLOOD PRESSURE: 84 MMHG | BODY MASS INDEX: 21.77 KG/M2 | HEART RATE: 71 BPM | WEIGHT: 147 LBS

## 2025-04-04 DIAGNOSIS — Z00.00 WELLNESS EXAMINATION: ICD-10-CM

## 2025-04-04 DIAGNOSIS — Z00.00 WELL ADULT EXAM: Primary | ICD-10-CM

## 2025-04-04 DIAGNOSIS — F10.90 ALCOHOL USE: ICD-10-CM

## 2025-04-04 DIAGNOSIS — Z72.0 TOBACCO ABUSE: ICD-10-CM

## 2025-04-04 DIAGNOSIS — F41.9 ANXIETY: ICD-10-CM

## 2025-04-04 DIAGNOSIS — F33.1 MODERATE EPISODE OF RECURRENT MAJOR DEPRESSIVE DISORDER: ICD-10-CM

## 2025-04-04 DIAGNOSIS — Z12.5 SCREENING FOR MALIGNANT NEOPLASM OF PROSTATE: ICD-10-CM

## 2025-04-04 DIAGNOSIS — I10 PRIMARY HYPERTENSION: ICD-10-CM

## 2025-04-04 PROBLEM — F33.9 RECURRENT DEPRESSION: Status: RESOLVED | Noted: 2025-04-04 | Resolved: 2025-04-04

## 2025-04-04 PROBLEM — F33.9 RECURRENT DEPRESSION: Status: ACTIVE | Noted: 2025-04-04

## 2025-04-04 PROBLEM — F32.1 CURRENT MODERATE EPISODE OF MAJOR DEPRESSIVE DISORDER WITHOUT PRIOR EPISODE: Status: ACTIVE | Noted: 2024-03-18

## 2025-04-04 PROBLEM — F32.1 CURRENT MODERATE EPISODE OF MAJOR DEPRESSIVE DISORDER WITHOUT PRIOR EPISODE: Status: RESOLVED | Noted: 2024-03-18 | Resolved: 2025-04-04

## 2025-04-04 LAB
25(OH)D3+25(OH)D2 SERPL-MCNC: 33 NG/ML (ref 30–80)
ALBUMIN SERPL-MCNC: 3.8 G/DL (ref 3.5–5)
ALBUMIN/GLOB SERPL: 1.2 RATIO (ref 1.1–2)
ALP SERPL-CCNC: 118 UNIT/L (ref 40–150)
ALT SERPL-CCNC: 7 UNIT/L (ref 0–55)
ANION GAP SERPL CALC-SCNC: 7 MEQ/L
AST SERPL-CCNC: 23 UNIT/L (ref 11–45)
BACTERIA #/AREA URNS AUTO: ABNORMAL /HPF
BASOPHILS # BLD AUTO: 0.04 X10(3)/MCL
BASOPHILS NFR BLD AUTO: 0.7 %
BILIRUB SERPL-MCNC: 0.2 MG/DL
BILIRUB UR QL STRIP.AUTO: NEGATIVE
BUN SERPL-MCNC: 14.5 MG/DL (ref 8.4–25.7)
CALCIUM SERPL-MCNC: 8.9 MG/DL (ref 8.4–10.2)
CHLORIDE SERPL-SCNC: 108 MMOL/L (ref 98–107)
CHOLEST SERPL-MCNC: 144 MG/DL
CHOLEST/HDLC SERPL: 4 {RATIO} (ref 0–5)
CLARITY UR: CLEAR
CO2 SERPL-SCNC: 23 MMOL/L (ref 22–29)
COLOR UR AUTO: ABNORMAL
CREAT SERPL-MCNC: 0.75 MG/DL (ref 0.72–1.25)
CREAT/UREA NIT SERPL: 19
EOSINOPHIL # BLD AUTO: 0.26 X10(3)/MCL (ref 0–0.9)
EOSINOPHIL NFR BLD AUTO: 4.6 %
ERYTHROCYTE [DISTWIDTH] IN BLOOD BY AUTOMATED COUNT: 12.9 % (ref 11.5–17)
EST. AVERAGE GLUCOSE BLD GHB EST-MCNC: 96.8 MG/DL
GFR SERPLBLD CREATININE-BSD FMLA CKD-EPI: >60 ML/MIN/1.73/M2
GLOBULIN SER-MCNC: 3.2 GM/DL (ref 2.4–3.5)
GLUCOSE SERPL-MCNC: 98 MG/DL (ref 74–100)
GLUCOSE UR QL STRIP: NORMAL
HBA1C MFR BLD: 5 %
HCT VFR BLD AUTO: 39.5 % (ref 42–52)
HDLC SERPL-MCNC: 41 MG/DL (ref 35–60)
HGB BLD-MCNC: 13.5 G/DL (ref 14–18)
HGB UR QL STRIP: NEGATIVE
IMM GRANULOCYTES # BLD AUTO: 0.01 X10(3)/MCL (ref 0–0.04)
IMM GRANULOCYTES NFR BLD AUTO: 0.2 %
KETONES UR QL STRIP: NEGATIVE
LDLC SERPL CALC-MCNC: 78 MG/DL (ref 50–140)
LEUKOCYTE ESTERASE UR QL STRIP: NEGATIVE
LYMPHOCYTES # BLD AUTO: 2.22 X10(3)/MCL (ref 0.6–4.6)
LYMPHOCYTES NFR BLD AUTO: 39.2 %
MCH RBC QN AUTO: 33.3 PG (ref 27–31)
MCHC RBC AUTO-ENTMCNC: 34.2 G/DL (ref 33–36)
MCV RBC AUTO: 97.5 FL (ref 80–94)
MONOCYTES # BLD AUTO: 0.55 X10(3)/MCL (ref 0.1–1.3)
MONOCYTES NFR BLD AUTO: 9.7 %
MUCOUS THREADS URNS QL MICRO: ABNORMAL /LPF
NEUTROPHILS # BLD AUTO: 2.58 X10(3)/MCL (ref 2.1–9.2)
NEUTROPHILS NFR BLD AUTO: 45.6 %
NITRITE UR QL STRIP: NEGATIVE
NRBC BLD AUTO-RTO: 0 %
PH UR STRIP: 6.5 [PH]
PLATELET # BLD AUTO: 277 X10(3)/MCL (ref 130–400)
PMV BLD AUTO: 9.8 FL (ref 7.4–10.4)
POTASSIUM SERPL-SCNC: 4.1 MMOL/L (ref 3.5–5.1)
PROT SERPL-MCNC: 7 GM/DL (ref 6.4–8.3)
PROT UR QL STRIP: NEGATIVE
PSA SERPL-MCNC: 0.62 NG/ML
RBC # BLD AUTO: 4.05 X10(6)/MCL (ref 4.7–6.1)
RBC #/AREA URNS AUTO: ABNORMAL /HPF
SODIUM SERPL-SCNC: 138 MMOL/L (ref 136–145)
SP GR UR STRIP.AUTO: 1.01 (ref 1–1.03)
SQUAMOUS #/AREA URNS LPF: ABNORMAL /HPF
TRIGL SERPL-MCNC: 125 MG/DL (ref 34–140)
TSH SERPL-ACNC: 1.57 UIU/ML (ref 0.35–4.94)
UROBILINOGEN UR STRIP-ACNC: NORMAL
VLDLC SERPL CALC-MCNC: 25 MG/DL
WBC # BLD AUTO: 5.66 X10(3)/MCL (ref 4.5–11.5)
WBC #/AREA URNS AUTO: ABNORMAL /HPF

## 2025-04-04 PROCEDURE — 36415 COLL VENOUS BLD VENIPUNCTURE: CPT

## 2025-04-04 PROCEDURE — 80053 COMPREHEN METABOLIC PANEL: CPT

## 2025-04-04 PROCEDURE — 82306 VITAMIN D 25 HYDROXY: CPT

## 2025-04-04 PROCEDURE — 84443 ASSAY THYROID STIM HORMONE: CPT

## 2025-04-04 PROCEDURE — 85025 COMPLETE CBC W/AUTO DIFF WBC: CPT

## 2025-04-04 PROCEDURE — 80061 LIPID PANEL: CPT

## 2025-04-04 PROCEDURE — 83036 HEMOGLOBIN GLYCOSYLATED A1C: CPT

## 2025-04-04 PROCEDURE — 84153 ASSAY OF PSA TOTAL: CPT

## 2025-04-04 PROCEDURE — 81001 URINALYSIS AUTO W/SCOPE: CPT

## 2025-04-04 RX ORDER — VILAZODONE HYDROCHLORIDE 20 MG/1
20 TABLET ORAL DAILY
Qty: 90 TABLET | Refills: 1 | Status: SHIPPED | OUTPATIENT
Start: 2025-04-04 | End: 2026-04-04

## 2025-04-04 NOTE — ASSESSMENT & PLAN NOTE
Resume Viibryd 20mg daily  Strongly encouraged counseling/CBT. He will be joining a [employer] in the near future.  Educated patient on the risks of serotonin based medications such as serotonin modulators and SSRIs/SNRIs including common side effects of nausea, GI upset, headache dizziness as well as the rare risk for worsening symptoms of depression including development of suicidal thoughts or ideations, and serotonin syndrome.   Exercise daily. Get sunlight daily.  Practice positive phrases and repeat throughout the day, along with yoga and relaxation techniques.  Establish good social support, make changes to reduce stress.  Reports any symptoms of suicidal/homicidal ideations or self harm immediately, if clinic is closed go to nearest emergency room.

## 2025-04-04 NOTE — ASSESSMENT & PLAN NOTE
Poorly controlled.   Hypertension Medications               amlodipine-olmesartan (KEITH) 5-40 mg per tablet Take 1 tablet by mouth once daily.   Needs to resume antihypertensive and follow up for BP check in 2 weeks  Low Sodium Diet (DASH Diet - Less than 2 grams of sodium per day).  Monitor blood pressure daily and log. Report consistent numbers greater than 140/90.  Maintain healthy weight with goal BMI <30. Exercise 30 minutes per day, 5 days per week.

## 2025-04-04 NOTE — PROGRESS NOTES
Internal Medicine    Patient ID: [id number]     Chief Complaint: Annual Exam    HPI:     Satya Hilliard is a 59 y.o. male here today for an annual wellness. Reviewed and discussed lab results.     Ran out of Viibryd for the past couple of week. Feeling depressed - tearful. Lost his home and plans to move into rental in the very near future - this is causing significant stress. Denies SI/HI or thoughts of self harm.     Reports having cardiac workup recently with [facility] - will obtain report. Continues to smoke - not yet ready to quit. Due for LDCT.      No other complaints today.     Health Maintenance         Date Due Completion Date    Hepatitis C Screening Never done ---    HIV Screening Never done ---    TETANUS VACCINE Never done ---    Pneumococcal Vaccines (Age 50+) (1 of 2 - PCV) Never done ---    High Dose Statin Never done ---    Shingles Vaccine (3 of 3) 10/17/2022 8/22/2022    COVID-19 Vaccine (1 - 2024-25 season) 12/13/2025 (Originally 9/1/2024) ---    LDCT Lung Screen 04/08/2025 4/8/2024    Colorectal Cancer Screening 05/17/2028 5/17/2021    Lipid Panel 04/04/2030 4/4/2025    RSV Vaccine (Age 60+ and Pregnant patients) (1 - 1-dose 75+ series) 10/05/2040 ---            Past Medical History:   Diagnosis Date    Depression     Hypertriglyceridemia     Personal history of colonic polyps 05/17/2021    Colonoscopy Report    Tobacco user         Past Surgical History:   Procedure Laterality Date    COLONOSCOPY  05/17/2021    Leeroy Amaya MD    HERNIA REPAIR  1989        Social History     Tobacco Use    Smoking status: Every Day     Current packs/day: 1.50     Average packs/day: 1.3 packs/day for 72.2 years (93.3 ttl pk-yrs)     Types: Cigarettes     Start date: 1/9/1983    Smokeless tobacco: Never    Tobacco comments:     None   Substance and Sexual Activity    Alcohol use: Yes     Alcohol/week: 24.0 standard drinks of alcohol     Types: 24 Cans of beer per week    Drug use: Never    Sexual activity: Not  "Currently     Partners: Female     Birth control/protection: None        Current Outpatient Medications   Medication Instructions    amlodipine-olmesartan (KEITH) 5-40 mg per tablet 1 tablet, Oral, Daily    aspirin 81 mg, Daily    gemfibroziL (LOPID) 600 mg, Oral, 2 times daily before meals    sildenafiL (VIAGRA) 50 mg, As needed (PRN)    tamsulosin (FLOMAX) 0.4 mg, Oral, Daily    vilazodone (VIIBRYD) 20 mg, Oral, Daily       Review of patient's allergies indicates:  No Known Allergies     Patient Care Team:  Elías Hou MD as PCP - General (Internal Medicine)  Brayden Lama MD as Consulting Physician (Urology)  Chung Montes De Oca MD as Consulting Physician (Cardiology)  [facility] (Gastroenterology)     Subjective:     Review of Systems    12 point review of systems conducted, negative except as stated in the history of present illness. See HPI for details.    Objective:     Visit Vitals  BP (!) 142/84 (BP Location: Left arm, Patient Position: Sitting)   Pulse 71   Temp 97.7 °F (36.5 °C) (Temporal)   Resp 16   Ht 5' 9" (1.753 m)   Wt 66.7 kg (147 lb)   SpO2 97%   BMI 21.71 kg/m²       Physical Exam  Vitals and nursing note reviewed.   Constitutional:       Appearance: Normal appearance.   HENT:      Head: Normocephalic and atraumatic.   Eyes:      Extraocular Movements: Extraocular movements intact.      Pupils: Pupils are equal, round, and reactive to light.   Cardiovascular:      Rate and Rhythm: Normal rate and regular rhythm.   Pulmonary:      Effort: Pulmonary effort is normal. No respiratory distress.      Breath sounds: Normal breath sounds.   Abdominal:      General: Bowel sounds are normal.      Palpations: Abdomen is soft.   Musculoskeletal:         General: Normal range of motion.   Skin:     General: Skin is warm and dry.   Neurological:      General: No focal deficit present.      Mental Status: He is alert.   Psychiatric:         Mood and Affect: Mood " normal. Affect is tearful.         Labs Reviewed:     Chemistry:  Lab Results   Component Value Date     04/04/2025    K 4.1 04/04/2025    BUN 14.5 04/04/2025    CREATININE 0.75 04/04/2025    EGFRNORACEVR >60 04/04/2025    GLUCOSE 98 04/04/2025    CALCIUM 8.9 04/04/2025    ALKPHOS 118 04/04/2025    LABPROT 7.0 04/04/2025    ALBUMIN 3.8 04/04/2025    BILIDIR 0.1 08/19/2022    IBILI 0.10 08/19/2022    AST 23 04/04/2025    ALT 7 04/04/2025    [id number] 33 04/04/2025    TSH 1.570 04/04/2025    PSA 0.60 03/18/2024        Lab Results   Component Value Date    HGBA1C 5.0 04/04/2025        Hematology:  Lab Results   Component Value Date    WBC 5.66 04/04/2025    HGB 13.5 (L) 04/04/2025    HCT 39.5 (L) 04/04/2025     04/04/2025       Lipid Panel:  Lab Results   Component Value Date    CHOL 144 04/04/2025    HDL 41 04/04/2025    LDL 78.00 04/04/2025    TRIG 125 04/04/2025    TOTALCHOLEST 4 04/04/2025        Urine:  Lab Results   Component Value Date    APPEARANCEUA Clear 04/04/2025    SGUA 1.009 04/04/2025    PROTEINUA Negative 04/04/2025    KETONESUA Negative 04/04/2025    LEUKOCYTESUR Negative 04/04/2025    RBCUA 0-5 04/04/2025    WBCUA 0-5 04/04/2025    BACTERIA None Seen 04/04/2025    SQEPUA None Seen 04/04/2025        Assessment:       ICD-10-CM ICD-9-CM   1. Well adult exam  Z00.00 V70.0   2. Anxiety  F41.9 300.00   3. Moderate episode of recurrent major depressive disorder  F33.1 296.32   4. Alcohol use  F10.90 305.00   5. Primary hypertension  I10 401.9   6. Tobacco abuse  Z72.0 305.1   7. Screening for malignant neoplasm of prostate  Z12.5 V76.44        Plan:     1. Well adult exam  Overview:  Prostate Cancer Screening -   Prostate Specific Antigen (ng/mL)   Date Value   03/18/2024 0.60      Follow up annually.    Colon Cancer Screening -  Colonoscopy on 5/2021. Follow up in 7 years.     Eye Exam - Recommend annually.     Dental Exam - Recommend biannual exams.     Vaccinations -   Immunization  History   Administered Date(s) Administered    Influenza - Quadrivalent - PF *Preferred* (6 months and older) 11/03/2015, 03/13/2023    Influenza - Trivalent - Fluarix, Flulaval, Fluzone, Afluria - PF 10/29/2018, 11/25/2019, 12/03/2021, 12/13/2024    Zoster 10/05/2020    Zoster Recombinant 08/22/2022          2. Anxiety  Assessment & Plan:  Resume Viibryd 20mg daily  Practice deep breathing or abdominal breathing exercises when anxiety occurs.  Exercise daily. Get sunlight daily.  Avoid caffeine, alcohol and stimulants.  Practice positive phrases and repeat throughout the day, along with yoga and relaxation techniques.  Set healthy boundaries, avoid people and conversations that increase stress.  Educated patient on the risks of serotonin based medications such as serotonin modulators and SSRIs/SNRIs including common side effects of nausea, GI upset, headache dizziness as well as the rare risk for worsening symptoms of depression including development of suicidal thoughts or ideations, and serotonin syndrome.   Reports any symptoms of suicidal or homicidal ideations immediately, if clinic is closed go to nearest emergency room.      3. Moderate episode of recurrent major depressive disorder  Assessment & Plan:  Resume Viibryd 20mg daily  Strongly encouraged counseling/CBT. He will be joining a [employer] in the near future.  Educated patient on the risks of serotonin based medications such as serotonin modulators and SSRIs/SNRIs including common side effects of nausea, GI upset, headache dizziness as well as the rare risk for worsening symptoms of depression including development of suicidal thoughts or ideations, and serotonin syndrome.   Exercise daily. Get sunlight daily.  Practice positive phrases and repeat throughout the day, along with yoga and relaxation techniques.  Establish good social support, make changes to reduce stress.  Reports any symptoms of suicidal/homicidal ideations or self harm immediately, if  clinic is closed go to nearest emergency room.    Orders:  -     vilazodone (VIIBRYD) 20 mg Tab; Take 1 tablet (20 mg total) by mouth Daily.  Dispense: 90 tablet; Refill: 1    4. Alcohol use  Assessment & Plan:  Stressed importance of abstinence        5. Primary hypertension  Assessment & Plan:  Poorly controlled.   Hypertension Medications               amlodipine-olmesartan (KEITH) 5-40 mg per tablet Take 1 tablet by mouth once daily.   Needs to resume antihypertensive and follow up for BP check in 2 weeks  Low Sodium Diet (DASH Diet - Less than 2 grams of sodium per day).  Monitor blood pressure daily and log. Report consistent numbers greater than 140/90.  Maintain healthy weight with goal BMI <30. Exercise 30 minutes per day, 5 days per week.      6. Tobacco abuse  Assessment & Plan:  Advised on cessation, low-dose screening CT of the chest due this month.    Orders:  -     CT Chest Lung Screening Low Dose; Future; Expected date: 04/04/2025    7. Screening for malignant neoplasm of prostate  -     PSA, Screening; Future; Expected date: 04/04/2025         Follow up in about 3 months (around 7/4/2025) for Routine Follow Up. In addition to their scheduled follow up, the patient has also been instructed to follow up on as needed basis.     Future Appointments   Date Time Provider Department Center   4/4/2025 12:00 PM ROSA MURRY [facility]   7/7/2025  8:20 AM Nano Wright FNP [place] [id number]        CARMEN Isaacs

## 2025-04-04 NOTE — ASSESSMENT & PLAN NOTE
Resume Viibryd 20mg daily  Practice deep breathing or abdominal breathing exercises when anxiety occurs.  Exercise daily. Get sunlight daily.  Avoid caffeine, alcohol and stimulants.  Practice positive phrases and repeat throughout the day, along with yoga and relaxation techniques.  Set healthy boundaries, avoid people and conversations that increase stress.  Educated patient on the risks of serotonin based medications such as serotonin modulators and SSRIs/SNRIs including common side effects of nausea, GI upset, headache dizziness as well as the rare risk for worsening symptoms of depression including development of suicidal thoughts or ideations, and serotonin syndrome.   Reports any symptoms of suicidal or homicidal ideations immediately, if clinic is closed go to nearest emergency room.

## 2025-04-28 ENCOUNTER — HOSPITAL ENCOUNTER (OUTPATIENT)
Dept: RADIOLOGY | Facility: HOSPITAL | Age: 60
Discharge: HOME | End: 2025-04-28
Attending: NURSE PRACTITIONER
Payer: COMMERCIAL

## 2025-04-28 DIAGNOSIS — Z12.2 SCREENING FOR MALIGNANT NEOPLASM OF RESPIRATORY ORGAN: ICD-10-CM

## 2025-04-28 PROCEDURE — 71271 CT THORAX LUNG CANCER SCR C-: CPT | Mod: TC

## 2025-04-30 ENCOUNTER — RESULTS FOLLOW-UP (OUTPATIENT)
Dept: INTERNAL MEDICINE | Facility: CLINIC | Age: 60
End: 2025-04-30

## 2025-04-30 NOTE — PROGRESS NOTES
Please inform patient of results.    1. Small pulmonary nodules noted on CT - repeat imaging in 1 year. Stress importance of smoking cessation.    Thanks for all you do,    Keenan

## 2025-05-29 DIAGNOSIS — E78.00 ELEVATED LDL CHOLESTEROL LEVEL: ICD-10-CM

## 2025-05-29 DIAGNOSIS — R33.9 URINARY RETENTION: ICD-10-CM

## 2025-05-30 RX ORDER — GEMFIBROZIL 600 MG/1
600 TABLET, FILM COATED ORAL
Qty: 180 TABLET | Refills: 3 | Status: SHIPPED | OUTPATIENT
Start: 2025-05-30 | End: 2026-05-30

## 2025-05-30 RX ORDER — TAMSULOSIN HYDROCHLORIDE 0.4 MG/1
1 CAPSULE ORAL DAILY
Qty: 90 CAPSULE | Refills: 3 | Status: SHIPPED | OUTPATIENT
Start: 2025-05-30

## 2025-06-16 DIAGNOSIS — I10 HYPERTENSION, UNSPECIFIED TYPE: ICD-10-CM

## 2025-06-16 RX ORDER — AMLODIPINE BESYLATE AND OLMESARTAN MEDOXOMIL 5; 40 MG/1; MG/1
1 TABLET ORAL
Qty: 90 TABLET | Refills: 1 | Status: SHIPPED | OUTPATIENT
Start: 2025-06-16

## 2025-06-27 DIAGNOSIS — F33.1 MODERATE EPISODE OF RECURRENT MAJOR DEPRESSIVE DISORDER: ICD-10-CM

## 2025-06-27 DIAGNOSIS — I10 HYPERTENSION, UNSPECIFIED TYPE: ICD-10-CM

## 2025-06-30 RX ORDER — VILAZODONE HYDROCHLORIDE 20 MG/1
20 TABLET ORAL DAILY
Qty: 90 TABLET | Refills: 1 | Status: SHIPPED | OUTPATIENT
Start: 2025-06-30 | End: 2026-06-30

## 2025-06-30 RX ORDER — AMLODIPINE BESYLATE AND OLMESARTAN MEDOXOMIL 5; 40 MG/1; MG/1
1 TABLET ORAL DAILY
Qty: 90 TABLET | Refills: 1 | Status: SHIPPED | OUTPATIENT
Start: 2025-06-30

## 2025-07-02 ENCOUNTER — TELEPHONE (OUTPATIENT)
Dept: INTERNAL MEDICINE | Facility: CLINIC | Age: 60
End: 2025-07-02
Payer: COMMERCIAL

## 2025-07-07 ENCOUNTER — OFFICE VISIT (OUTPATIENT)
Dept: INTERNAL MEDICINE | Facility: CLINIC | Age: 60
End: 2025-07-07
Payer: COMMERCIAL

## 2025-07-07 VITALS
BODY MASS INDEX: 22.22 KG/M2 | HEART RATE: 64 BPM | OXYGEN SATURATION: 95 % | DIASTOLIC BLOOD PRESSURE: 72 MMHG | SYSTOLIC BLOOD PRESSURE: 122 MMHG | HEIGHT: 69 IN | WEIGHT: 150 LBS | RESPIRATION RATE: 16 BRPM

## 2025-07-07 DIAGNOSIS — I10 HYPERTENSION, UNSPECIFIED TYPE: Primary | ICD-10-CM

## 2025-07-07 DIAGNOSIS — F33.1 MODERATE EPISODE OF RECURRENT MAJOR DEPRESSIVE DISORDER: ICD-10-CM

## 2025-07-07 DIAGNOSIS — F41.9 ANXIETY: ICD-10-CM

## 2025-07-07 DIAGNOSIS — E78.00 ELEVATED LDL CHOLESTEROL LEVEL: ICD-10-CM

## 2025-07-07 DIAGNOSIS — F10.90 ALCOHOL USE: ICD-10-CM

## 2025-07-07 DIAGNOSIS — Z72.0 TOBACCO ABUSE: ICD-10-CM

## 2025-07-07 PROCEDURE — 3074F SYST BP LT 130 MM HG: CPT | Mod: CPTII,,, | Performed by: NURSE PRACTITIONER

## 2025-07-07 PROCEDURE — 99214 OFFICE O/P EST MOD 30 MIN: CPT | Mod: ,,, | Performed by: NURSE PRACTITIONER

## 2025-07-07 PROCEDURE — 3078F DIAST BP <80 MM HG: CPT | Mod: CPTII,,, | Performed by: NURSE PRACTITIONER

## 2025-07-07 PROCEDURE — 3044F HG A1C LEVEL LT 7.0%: CPT | Mod: CPTII,,, | Performed by: NURSE PRACTITIONER

## 2025-07-07 PROCEDURE — 4010F ACE/ARB THERAPY RXD/TAKEN: CPT | Mod: CPTII,,, | Performed by: NURSE PRACTITIONER

## 2025-07-07 PROCEDURE — 3008F BODY MASS INDEX DOCD: CPT | Mod: CPTII,,, | Performed by: NURSE PRACTITIONER

## 2025-07-07 PROCEDURE — 1160F RVW MEDS BY RX/DR IN RCRD: CPT | Mod: CPTII,,, | Performed by: NURSE PRACTITIONER

## 2025-07-07 PROCEDURE — 1159F MED LIST DOCD IN RCRD: CPT | Mod: CPTII,,, | Performed by: NURSE PRACTITIONER

## 2025-07-07 RX ORDER — VILAZODONE HYDROCHLORIDE 20 MG/1
20 TABLET ORAL DAILY
Qty: 90 TABLET | Refills: 1 | Status: SHIPPED | OUTPATIENT
Start: 2025-07-07

## 2025-07-07 RX ORDER — AMLODIPINE BESYLATE AND OLMESARTAN MEDOXOMIL 5; 40 MG/1; MG/1
1 TABLET ORAL DAILY
Qty: 90 TABLET | Refills: 1 | Status: SHIPPED | OUTPATIENT
Start: 2025-07-07

## 2025-07-07 NOTE — ASSESSMENT & PLAN NOTE
Stable  Continue Viibryd 20mg daily  Educated patient on the risks of serotonin based medications such as serotonin modulators and SSRIs/SNRIs including common side effects of nausea, GI upset, headache dizziness as well as the rare risk for worsening symptoms of depression including development of suicidal thoughts or ideations, and serotonin syndrome.   Exercise daily. Get sunlight daily.  Practice positive phrases and repeat throughout the day, along with yoga and relaxation techniques.  Establish good social support, make changes to reduce stress.  Reports any symptoms of suicidal/homicidal ideations or self harm immediately, if clinic is closed go to nearest emergency room.

## 2025-07-07 NOTE — PROGRESS NOTES
Internal Medicine      Patient Name:  Satya Hilliard  Patient ID:  [id number]    Chief Complaint:  3 month f/u      Satya Hilliard is a 59 y.o. male, known to Dr Dc, is here today for 3-month follow up.  Medical comorbidities include , HLD, anxiety, depression, alcohol use, tobacco abuse.  Also has small pulmonary nodule noted on CT (4/28/25), plans to repeat CT in 1 year.    Patient presents today for three-month follow-up.  He reports he is doing well with no acute issues or concerns.  Requesting refill of medications.  He does report intermittent palpitations, occurring approximately twice per year.  He is followed by Cardiology.  He does continue to smoke, states he is not ready to quit.    Last AWV:  4/4/25       Past Medical History:   Diagnosis Date    Depression     Hypertriglyceridemia     Personal history of colonic polyps 05/17/2021    Colonoscopy Report    Tobacco user         Past Surgical History:   Procedure Laterality Date    COLONOSCOPY  05/17/2021    Leeroy Amaya MD    HERNIA REPAIR  1989        Social History     Tobacco Use    Smoking status: Every Day     Current packs/day: 1.50     Average packs/day: 1.3 packs/day for 72.5 years (93.7 ttl pk-yrs)     Types: Cigarettes     Start date: 1/9/1983    Smokeless tobacco: Never    Tobacco comments:     None   Substance and Sexual Activity    Alcohol use: Yes     Alcohol/week: 24.0 standard drinks of alcohol     Types: 24 Cans of beer per week    Drug use: Never    Sexual activity: Not Currently     Partners: Female     Birth control/protection: None        Current Outpatient Medications   Medication Instructions    amlodipine-olmesartan (KEITH) 5-40 mg per tablet 1 tablet, Oral, Daily    aspirin 81 mg, Daily    gemfibroziL (LOPID) 600 mg, Oral, 2 times daily before meals    sildenafiL (VIAGRA) 50 mg, As needed (PRN)    tamsulosin (FLOMAX) 0.4 mg, Oral, Daily    vilazodone (VIIBRYD) 20 mg, Oral, Daily       Review of patient's allergies  "indicates:  No Known Allergies     Patient Care Team:  Elías Hou MD as PCP - General (Internal Medicine)  Brayden Lama MD as Consulting Physician (Urology)  Chung Montes De Oca MD as Consulting Physician (Cardiology)  [facility] (Gastroenterology)       Subjective:    Review of Systems    12 point review of systems conducted, negative except as stated in the history of present illness. See HPI for details.      Objective:    Visit Vitals  /72 (BP Location: Left arm, Patient Position: Sitting)   Pulse 64   Resp 16   Ht 5' 9" (1.753 m)   Wt 68 kg (150 lb)   SpO2 95%   BMI 22.15 kg/m²       Physical Exam  Vitals and nursing note reviewed.   Constitutional:       General: He is not in acute distress.     Appearance: He is normal weight. He is not ill-appearing.   HENT:      Head: Normocephalic and atraumatic.      Mouth/Throat:      Mouth: Mucous membranes are moist.      Pharynx: Oropharynx is clear.   Eyes:      General: No scleral icterus.     Extraocular Movements: Extraocular movements intact.      Conjunctiva/sclera: Conjunctivae normal.      Pupils: Pupils are equal, round, and reactive to light.   Neck:      Vascular: No carotid bruit.   Cardiovascular:      Rate and Rhythm: Normal rate and regular rhythm.      Heart sounds: Normal heart sounds. No murmur heard.     No friction rub. No gallop.   Pulmonary:      Effort: Pulmonary effort is normal. No respiratory distress.      Breath sounds: Normal breath sounds. No wheezing, rhonchi or rales.   Abdominal:      General: Bowel sounds are normal. There is no distension.      Palpations: Abdomen is soft. There is no mass.      Tenderness: There is no abdominal tenderness. There is no guarding.   Musculoskeletal:         General: Normal range of motion.      Cervical back: Normal range of motion and neck supple.   Skin:     General: Skin is warm and dry.      Capillary Refill: Capillary refill takes less than 2 " seconds.   Neurological:      General: No focal deficit present.      Mental Status: He is alert and oriented to person, place, and time.   Psychiatric:         Mood and Affect: Mood normal.         Behavior: Behavior normal.       Labs Reviewed:    Chemistry:  Lab Results   Component Value Date     04/04/2025    K 4.1 04/04/2025    BUN 14.5 04/04/2025    CREATININE 0.75 04/04/2025    EGFRNORACEVR >60 04/04/2025    CALCIUM 8.9 04/04/2025    ALKPHOS 118 04/04/2025    ALBUMIN 3.8 04/04/2025    BILIDIR 0.1 08/19/2022    IBILI 0.10 08/19/2022    AST 23 04/04/2025    ALT 7 04/04/2025    [id number] 33 04/04/2025    TSH 1.570 04/04/2025    PSA 0.62 04/04/2025        Lab Results   Component Value Date    HGBA1C 5.0 04/04/2025        Hematology:  Lab Results   Component Value Date    WBC 5.66 04/04/2025    HGB 13.5 (L) 04/04/2025    HCT 39.5 (L) 04/04/2025     04/04/2025       Lipid Panel:  Lab Results   Component Value Date    CHOL 144 04/04/2025    HDL 41 04/04/2025    LDL 78.00 04/04/2025    TRIG 125 04/04/2025    TOTALCHOLEST 4 04/04/2025        Urine:  Lab Results   Component Value Date    APPEARANCEUA Clear 04/04/2025    SGUA 1.009 04/04/2025    PROTEINUA Negative 04/04/2025    KETONESUA Negative 04/04/2025    LEUKOCYTESUR Negative 04/04/2025    RBCUA 0-5 04/04/2025    WBCUA 0-5 04/04/2025    BACTERIA None Seen 04/04/2025    SQEPUA None Seen 04/04/2025          Assessment:      ICD-10-CM ICD-9-CM   1. Hypertension, unspecified type  I10 401.9   2. Moderate episode of recurrent major depressive disorder  F33.1 296.32   3. Anxiety  F41.9 300.00   4. Elevated LDL cholesterol level  E78.00 272.0   5. Tobacco abuse  Z72.0 305.1   6. Alcohol use  F10.90 305.00        Plan:    1. Hypertension, unspecified type  Assessment & Plan:  Reports BP is well controlled.  At goal in office today.  Continue Amlodipine-Olmesartan 5-40mg daily  Encouraged low sodium diet  Continue to monitor blood pressure at home and  report readings consistently above 140/90.    Orders:  -     amlodipine-olmesartan (KEITH) 5-40 mg per tablet; Take 1 tablet by mouth once daily.  Dispense: 90 tablet; Refill: 1    2. Moderate episode of recurrent major depressive disorder  Assessment & Plan:  Stable  Continue Viibryd 20mg daily  Educated patient on the risks of serotonin based medications such as serotonin modulators and SSRIs/SNRIs including common side effects of nausea, GI upset, headache dizziness as well as the rare risk for worsening symptoms of depression including development of suicidal thoughts or ideations, and serotonin syndrome.   Exercise daily. Get sunlight daily.  Practice positive phrases and repeat throughout the day, along with yoga and relaxation techniques.  Establish good social support, make changes to reduce stress.  Reports any symptoms of suicidal/homicidal ideations or self harm immediately, if clinic is closed go to nearest emergency room.    Orders:  -     vilazodone (VIIBRYD) 20 mg Tab; Take 1 tablet (20 mg total) by mouth Daily.  Dispense: 90 tablet; Refill: 1    3. Anxiety  Assessment & Plan:  Stable  Continue Viibryd 20mg daily  Practice deep breathing or abdominal breathing exercises when anxiety occurs.  Exercise daily. Get sunlight daily.  Avoid caffeine, alcohol and stimulants.  Practice positive phrases and repeat throughout the day, along with yoga and relaxation techniques.  Set healthy boundaries, avoid people and conversations that increase stress.  Educated patient on the risks of serotonin based medications such as serotonin modulators and SSRIs/SNRIs including common side effects of nausea, GI upset, headache dizziness as well as the rare risk for worsening symptoms of depression including development of suicidal thoughts or ideations, and serotonin syndrome.   Reports any symptoms of suicidal or homicidal ideations immediately, if clinic is closed go to nearest emergency room.      4. Elevated LDL  cholesterol level  Assessment & Plan:  Lab Results   Component Value Date    LDL 78.00 04/04/2025    TRIG 125 04/04/2025    HDL 41 04/04/2025    TOTALCHOLEST 4 04/04/2025     Well controlled  Continue Gemfibrozil 600mg BID  Stressed importance of dietary modifications. Follow a low cholesterol, low saturated fat diet with less that 200mg of cholesterol a day.  Avoid fried foods and high saturated fats (high saturated fats less than 7% of calories).  Regular exercise can reduce LDL and raise HDL. Stressed importance of physical activity 5 times per week for 30 minutes per day.       5. Tobacco abuse  Overview:  CT chest (4/28/25): couple of punctate 1-2 mm nodules in the right upper lobe, also 3mm nodule in the right upper lobe which is stable since prior exam  Plan to repeat CT chest in 1 year.    Assessment & Plan:  Advised on smoking cessation, but patient is not ready to quit.      6. Alcohol use  Assessment & Plan:  Stressed importance of abstinence  Not ready to quit         Follow up in about 3 months (around 10/7/2025) for routine follow up. In addition to their scheduled follow up, the patient has also been instructed to follow up on as needed basis.       Future Appointments   Date Time Provider Department Center   10/20/2025  9:00 AM Elías Hou MD [facility] [id number]   4/20/2026  9:40 AM Elías Hou MD [facility] [id number]          CARMEN Jo

## 2025-07-07 NOTE — ASSESSMENT & PLAN NOTE
Stable  Continue Viibryd 20mg daily  Practice deep breathing or abdominal breathing exercises when anxiety occurs.  Exercise daily. Get sunlight daily.  Avoid caffeine, alcohol and stimulants.  Practice positive phrases and repeat throughout the day, along with yoga and relaxation techniques.  Set healthy boundaries, avoid people and conversations that increase stress.  Educated patient on the risks of serotonin based medications such as serotonin modulators and SSRIs/SNRIs including common side effects of nausea, GI upset, headache dizziness as well as the rare risk for worsening symptoms of depression including development of suicidal thoughts or ideations, and serotonin syndrome.   Reports any symptoms of suicidal or homicidal ideations immediately, if clinic is closed go to nearest emergency room.

## 2025-07-07 NOTE — ASSESSMENT & PLAN NOTE
Lab Results   Component Value Date    LDL 78.00 04/04/2025    TRIG 125 04/04/2025    HDL 41 04/04/2025    TOTALCHOLEST 4 04/04/2025     Well controlled  Continue Gemfibrozil 600mg BID  Stressed importance of dietary modifications. Follow a low cholesterol, low saturated fat diet with less that 200mg of cholesterol a day.  Avoid fried foods and high saturated fats (high saturated fats less than 7% of calories).  Regular exercise can reduce LDL and raise HDL. Stressed importance of physical activity 5 times per week for 30 minutes per day.

## 2025-07-07 NOTE — ASSESSMENT & PLAN NOTE
Reports BP is well controlled.  At goal in office today.  Continue Amlodipine-Olmesartan 5-40mg daily  Encouraged low sodium diet  Continue to monitor blood pressure at home and report readings consistently above 140/90.

## 2025-07-15 ENCOUNTER — PATIENT MESSAGE (OUTPATIENT)
Dept: INTERNAL MEDICINE | Facility: CLINIC | Age: 60
End: 2025-07-15
Payer: COMMERCIAL

## 2025-07-15 DIAGNOSIS — F33.1 MODERATE EPISODE OF RECURRENT MAJOR DEPRESSIVE DISORDER: ICD-10-CM

## 2025-07-15 RX ORDER — VILAZODONE HYDROCHLORIDE 20 MG/1
20 TABLET ORAL DAILY
Qty: 90 TABLET | Refills: 0 | Status: SHIPPED | OUTPATIENT
Start: 2025-07-15